# Patient Record
Sex: MALE | Race: WHITE | NOT HISPANIC OR LATINO | Employment: OTHER | URBAN - NONMETROPOLITAN AREA
[De-identification: names, ages, dates, MRNs, and addresses within clinical notes are randomized per-mention and may not be internally consistent; named-entity substitution may affect disease eponyms.]

---

## 2018-05-24 ENCOUNTER — APPOINTMENT (OUTPATIENT)
Dept: GENERAL RADIOLOGY | Facility: HOSPITAL | Age: 68
End: 2018-05-24

## 2018-05-24 ENCOUNTER — HOSPITAL ENCOUNTER (OUTPATIENT)
Facility: HOSPITAL | Age: 68
Setting detail: OBSERVATION
Discharge: HOME HEALTH | End: 2018-05-27
Attending: EMERGENCY MEDICINE | Admitting: INTERNAL MEDICINE

## 2018-05-24 DIAGNOSIS — I48.91 ATRIAL FIBRILLATION, UNSPECIFIED TYPE (HCC): ICD-10-CM

## 2018-05-24 DIAGNOSIS — I95.9 HYPOTENSION, UNSPECIFIED HYPOTENSION TYPE: Primary | ICD-10-CM

## 2018-05-24 PROBLEM — J42 CHRONIC BRONCHITIS (HCC): Chronic | Status: ACTIVE | Noted: 2018-05-24

## 2018-05-24 PROBLEM — N18.30 CKD (CHRONIC KIDNEY DISEASE) STAGE 3, GFR 30-59 ML/MIN (HCC): Chronic | Status: ACTIVE | Noted: 2018-05-24

## 2018-05-24 PROBLEM — I70.90 ATHEROSCLEROSIS: Chronic | Status: ACTIVE | Noted: 2018-05-24

## 2018-05-24 PROBLEM — I48.20 CHRONIC ATRIAL FIBRILLATION (HCC): Chronic | Status: ACTIVE | Noted: 2018-05-24

## 2018-05-24 LAB
ALBUMIN SERPL-MCNC: 4.2 G/DL (ref 3.4–4.8)
ALBUMIN/GLOB SERPL: 1.4 G/DL (ref 1.1–1.8)
ALP SERPL-CCNC: 64 U/L (ref 38–126)
ALT SERPL W P-5'-P-CCNC: 25 U/L (ref 21–72)
ANION GAP SERPL CALCULATED.3IONS-SCNC: 12 MMOL/L (ref 5–15)
AST SERPL-CCNC: 17 U/L (ref 17–59)
BASOPHILS # BLD AUTO: 0.03 10*3/MM3 (ref 0–0.2)
BASOPHILS NFR BLD AUTO: 0.3 % (ref 0–2)
BILIRUB SERPL-MCNC: 0.6 MG/DL (ref 0.2–1.3)
BILIRUB UR QL STRIP: NEGATIVE
BUN BLD-MCNC: 32 MG/DL (ref 7–21)
BUN/CREAT SERPL: 18 (ref 7–25)
CALCIUM SPEC-SCNC: 9.3 MG/DL (ref 8.4–10.2)
CHLORIDE SERPL-SCNC: 97 MMOL/L (ref 95–110)
CLARITY UR: CLEAR
CO2 SERPL-SCNC: 26 MMOL/L (ref 22–31)
COLOR UR: YELLOW
CREAT BLD-MCNC: 1.78 MG/DL (ref 0.7–1.3)
D-LACTATE SERPL-SCNC: 2.1 MMOL/L (ref 0.5–2)
D-LACTATE SERPL-SCNC: 2.4 MMOL/L (ref 0.5–2)
DEPRECATED RDW RBC AUTO: 42.3 FL (ref 35.1–43.9)
EOSINOPHIL # BLD AUTO: 0.16 10*3/MM3 (ref 0–0.7)
EOSINOPHIL NFR BLD AUTO: 1.8 % (ref 0–7)
ERYTHROCYTE [DISTWIDTH] IN BLOOD BY AUTOMATED COUNT: 13 % (ref 11.5–14.5)
GFR SERPL CREATININE-BSD FRML MDRD: 38 ML/MIN/1.73 (ref 49–113)
GLOBULIN UR ELPH-MCNC: 2.9 GM/DL (ref 2.3–3.5)
GLUCOSE BLD-MCNC: 191 MG/DL (ref 60–100)
GLUCOSE UR STRIP-MCNC: ABNORMAL MG/DL
HCT VFR BLD AUTO: 40.9 % (ref 39–49)
HGB BLD-MCNC: 14.7 G/DL (ref 13.7–17.3)
HGB UR QL STRIP.AUTO: NEGATIVE
HOLD SPECIMEN: NORMAL
IMM GRANULOCYTES # BLD: 0.03 10*3/MM3 (ref 0–0.02)
IMM GRANULOCYTES NFR BLD: 0.3 % (ref 0–0.5)
INR PPP: 1.11 (ref 0.8–1.2)
KETONES UR QL STRIP: NEGATIVE
LEUKOCYTE ESTERASE UR QL STRIP.AUTO: NEGATIVE
LYMPHOCYTES # BLD AUTO: 2.68 10*3/MM3 (ref 0.6–4.2)
LYMPHOCYTES NFR BLD AUTO: 29.7 % (ref 10–50)
MAGNESIUM SERPL-MCNC: 1.8 MG/DL (ref 1.6–2.3)
MCH RBC QN AUTO: 32 PG (ref 26.5–34)
MCHC RBC AUTO-ENTMCNC: 35.9 G/DL (ref 31.5–36.3)
MCV RBC AUTO: 89.1 FL (ref 80–98)
MONOCYTES # BLD AUTO: 0.57 10*3/MM3 (ref 0–0.9)
MONOCYTES NFR BLD AUTO: 6.3 % (ref 0–12)
NEUTROPHILS # BLD AUTO: 5.54 10*3/MM3 (ref 2–8.6)
NEUTROPHILS NFR BLD AUTO: 61.6 % (ref 37–80)
NITRITE UR QL STRIP: NEGATIVE
NT-PROBNP SERPL-MCNC: 426 PG/ML (ref 0–900)
PH UR STRIP.AUTO: 5.5 [PH] (ref 5–9)
PLATELET # BLD AUTO: 188 10*3/MM3 (ref 150–450)
PMV BLD AUTO: 10.3 FL (ref 8–12)
POTASSIUM BLD-SCNC: 4.2 MMOL/L (ref 3.5–5.1)
PROT SERPL-MCNC: 7.1 G/DL (ref 6.3–8.6)
PROT UR QL STRIP: NEGATIVE
PROTHROMBIN TIME: 14.1 SECONDS (ref 11.1–15.3)
RBC # BLD AUTO: 4.59 10*6/MM3 (ref 4.37–5.74)
SODIUM BLD-SCNC: 135 MMOL/L (ref 137–145)
SP GR UR STRIP: 1.02 (ref 1–1.03)
TROPONIN I SERPL-MCNC: 0.03 NG/ML
UROBILINOGEN UR QL STRIP: ABNORMAL
WBC NRBC COR # BLD: 9.01 10*3/MM3 (ref 3.2–9.8)
WHOLE BLOOD HOLD SPECIMEN: NORMAL
WHOLE BLOOD HOLD SPECIMEN: NORMAL

## 2018-05-24 PROCEDURE — 85610 PROTHROMBIN TIME: CPT | Performed by: EMERGENCY MEDICINE

## 2018-05-24 PROCEDURE — G0378 HOSPITAL OBSERVATION PER HR: HCPCS

## 2018-05-24 PROCEDURE — 99285 EMERGENCY DEPT VISIT HI MDM: CPT

## 2018-05-24 PROCEDURE — 82962 GLUCOSE BLOOD TEST: CPT

## 2018-05-24 PROCEDURE — 93005 ELECTROCARDIOGRAM TRACING: CPT | Performed by: EMERGENCY MEDICINE

## 2018-05-24 PROCEDURE — 96360 HYDRATION IV INFUSION INIT: CPT

## 2018-05-24 PROCEDURE — 84484 ASSAY OF TROPONIN QUANT: CPT | Performed by: PHYSICIAN ASSISTANT

## 2018-05-24 PROCEDURE — 93010 ELECTROCARDIOGRAM REPORT: CPT | Performed by: INTERNAL MEDICINE

## 2018-05-24 PROCEDURE — 83605 ASSAY OF LACTIC ACID: CPT | Performed by: EMERGENCY MEDICINE

## 2018-05-24 PROCEDURE — 87040 BLOOD CULTURE FOR BACTERIA: CPT | Performed by: EMERGENCY MEDICINE

## 2018-05-24 PROCEDURE — 85025 COMPLETE CBC W/AUTO DIFF WBC: CPT | Performed by: EMERGENCY MEDICINE

## 2018-05-24 PROCEDURE — 71045 X-RAY EXAM CHEST 1 VIEW: CPT

## 2018-05-24 PROCEDURE — 96361 HYDRATE IV INFUSION ADD-ON: CPT

## 2018-05-24 PROCEDURE — 81003 URINALYSIS AUTO W/O SCOPE: CPT | Performed by: EMERGENCY MEDICINE

## 2018-05-24 PROCEDURE — 80053 COMPREHEN METABOLIC PANEL: CPT | Performed by: EMERGENCY MEDICINE

## 2018-05-24 PROCEDURE — 83880 ASSAY OF NATRIURETIC PEPTIDE: CPT | Performed by: PHYSICIAN ASSISTANT

## 2018-05-24 PROCEDURE — 83735 ASSAY OF MAGNESIUM: CPT | Performed by: PHYSICIAN ASSISTANT

## 2018-05-24 RX ORDER — TAMSULOSIN HYDROCHLORIDE 0.4 MG/1
2 CAPSULE ORAL NIGHTLY
COMMUNITY

## 2018-05-24 RX ORDER — DEXTROSE MONOHYDRATE 25 G/50ML
25 INJECTION, SOLUTION INTRAVENOUS
Status: DISCONTINUED | OUTPATIENT
Start: 2018-05-24 | End: 2018-05-27 | Stop reason: HOSPADM

## 2018-05-24 RX ORDER — GABAPENTIN 300 MG/1
300 CAPSULE ORAL EVERY 12 HOURS SCHEDULED
Status: DISCONTINUED | OUTPATIENT
Start: 2018-05-25 | End: 2018-05-27 | Stop reason: HOSPADM

## 2018-05-24 RX ORDER — LEVOTHYROXINE SODIUM 112 UG/1
112 TABLET ORAL
Status: DISCONTINUED | OUTPATIENT
Start: 2018-05-25 | End: 2018-05-27 | Stop reason: HOSPADM

## 2018-05-24 RX ORDER — ATORVASTATIN CALCIUM 80 MG/1
80 TABLET, FILM COATED ORAL DAILY
COMMUNITY

## 2018-05-24 RX ORDER — ONDANSETRON HYDROCHLORIDE 8 MG/1
TABLET, FILM COATED ORAL EVERY 8 HOURS PRN
COMMUNITY

## 2018-05-24 RX ORDER — SODIUM CHLORIDE 9 MG/ML
75 INJECTION, SOLUTION INTRAVENOUS CONTINUOUS
Status: DISPENSED | OUTPATIENT
Start: 2018-05-24 | End: 2018-05-25

## 2018-05-24 RX ORDER — SODIUM CHLORIDE 0.9 % (FLUSH) 0.9 %
1-10 SYRINGE (ML) INJECTION AS NEEDED
Status: DISCONTINUED | OUTPATIENT
Start: 2018-05-24 | End: 2018-05-25 | Stop reason: CLARIF

## 2018-05-24 RX ORDER — ATORVASTATIN CALCIUM 40 MG/1
80 TABLET, FILM COATED ORAL DAILY
Status: DISCONTINUED | OUTPATIENT
Start: 2018-05-25 | End: 2018-05-27 | Stop reason: HOSPADM

## 2018-05-24 RX ORDER — LEVOTHYROXINE SODIUM 112 UG/1
112 TABLET ORAL DAILY
COMMUNITY

## 2018-05-24 RX ORDER — NICOTINE POLACRILEX 4 MG
15 LOZENGE BUCCAL
Status: DISCONTINUED | OUTPATIENT
Start: 2018-05-24 | End: 2018-05-27 | Stop reason: HOSPADM

## 2018-05-24 RX ORDER — METOPROLOL TARTRATE 100 MG/1
100 TABLET ORAL 2 TIMES DAILY
COMMUNITY
End: 2018-05-27 | Stop reason: HOSPADM

## 2018-05-24 RX ORDER — LOSARTAN POTASSIUM 25 MG/1
25 TABLET ORAL DAILY
COMMUNITY
End: 2018-05-27 | Stop reason: HOSPADM

## 2018-05-24 RX ORDER — FUROSEMIDE 40 MG/1
40 TABLET ORAL 2 TIMES DAILY
COMMUNITY
End: 2018-05-27 | Stop reason: HOSPADM

## 2018-05-24 RX ORDER — SODIUM CHLORIDE 0.9 % (FLUSH) 0.9 %
10 SYRINGE (ML) INJECTION AS NEEDED
Status: DISCONTINUED | OUTPATIENT
Start: 2018-05-24 | End: 2018-05-27 | Stop reason: HOSPADM

## 2018-05-24 RX ORDER — TAMSULOSIN HYDROCHLORIDE 0.4 MG/1
0.4 CAPSULE ORAL NIGHTLY
Status: DISCONTINUED | OUTPATIENT
Start: 2018-05-25 | End: 2018-05-27 | Stop reason: HOSPADM

## 2018-05-24 RX ORDER — SPIRONOLACTONE 25 MG/1
25 TABLET ORAL DAILY
COMMUNITY

## 2018-05-24 RX ORDER — OMEPRAZOLE 20 MG/1
20 CAPSULE, DELAYED RELEASE ORAL DAILY
COMMUNITY

## 2018-05-24 RX ORDER — SODIUM CHLORIDE 9 MG/ML
INJECTION, SOLUTION INTRAVENOUS
Status: DISCONTINUED
Start: 2018-05-24 | End: 2018-05-27 | Stop reason: HOSPADM

## 2018-05-24 RX ORDER — GABAPENTIN 300 MG/1
400 CAPSULE ORAL 3 TIMES DAILY
COMMUNITY

## 2018-05-24 RX ADMIN — SODIUM CHLORIDE 1000 ML: 9 INJECTION, SOLUTION INTRAVENOUS at 19:22

## 2018-05-24 RX ADMIN — SODIUM CHLORIDE 1000 ML: 900 INJECTION, SOLUTION INTRAVENOUS at 18:38

## 2018-05-24 NOTE — ED PROVIDER NOTES
"Subjective   Patient presents to emergency department for Dizziness x 4 days.  States over the past few days he has had \"dry heaves\" and has not been able to keep food/fluids/medication down.  Denies any active vomiting.  Endorses associated muscle cramping in multiple different locations of his body.  Hx of chronic atrial fibrillation and on eliquis.          History provided by:  Patient   used: No    Dizziness   Quality:  Lightheadedness  Severity:  Moderate  Onset quality:  Gradual  Duration:  4 days  Timing:  Intermittent  Progression:  Worsening  Chronicity:  New  Context: standing up    Relieved by:  Lying down and being still  Associated symptoms: nausea    Associated symptoms: no headaches, no palpitations, no shortness of breath, no syncope, no vision changes, no vomiting and no weakness    Risk factors: multiple medications        Review of Systems   Constitutional: Negative for chills, fever and unexpected weight change.   HENT: Negative for trouble swallowing.    Eyes: Negative for visual disturbance.   Respiratory: Negative for shortness of breath.    Cardiovascular: Negative for palpitations and syncope.   Gastrointestinal: Positive for nausea. Negative for vomiting.   Genitourinary: Negative for dysuria and flank pain.   Musculoskeletal: Negative for back pain and neck pain.   Skin: Negative for color change.   Allergic/Immunologic: Negative for immunocompromised state.   Neurological: Positive for dizziness and light-headedness. Negative for seizures, syncope, facial asymmetry, speech difficulty, weakness, numbness and headaches.   Hematological: Does not bruise/bleed easily.       Past Medical History:   Diagnosis Date   • Atrial fibrillation    • CHF (congestive heart failure)    • Diabetes mellitus    • GERD (gastroesophageal reflux disease)    • Graves disease    • Hypertension        No Known Allergies    Past Surgical History:   Procedure Laterality Date   • HERNIA REPAIR   " "  • TONSILLECTOMY         History reviewed. No pertinent family history.    Social History     Social History   • Marital status:      Social History Main Topics   • Smoking status: Current Every Day Smoker     Packs/day: 1.00     Types: Cigarettes   • Smokeless tobacco: Never Used   • Alcohol use No   • Drug use: No     Other Topics Concern   • Not on file           Objective      BP (!) 85/52   Pulse 88   Temp 97 °F (36.1 °C)   Resp 16   Ht 177.8 cm (70\")   Wt 86.2 kg (190 lb)   SpO2 100%   BMI 27.26 kg/m²     Physical Exam   Constitutional: He is oriented to person, place, and time. He appears well-developed and well-nourished. No distress.   Eyes: Conjunctivae and EOM are normal.   Neck: Neck supple.   Cardiovascular: Normal rate, regular rhythm and intact distal pulses.    Pulmonary/Chest: Effort normal and breath sounds normal. No respiratory distress. He has no wheezes.   Abdominal: Soft. Bowel sounds are normal. He exhibits no distension. There is no tenderness.   Neurological: He is alert and oriented to person, place, and time. He has normal strength. No cranial nerve deficit or sensory deficit. He displays a negative Romberg sign. GCS eye subscore is 4. GCS verbal subscore is 5. GCS motor subscore is 6.   Skin: Skin is warm. Capillary refill takes less than 2 seconds.   Psychiatric: He has a normal mood and affect. His behavior is normal. Thought content normal.   Nursing note and vitals reviewed.      Procedures           ED Course      Results for orders placed or performed during the hospital encounter of 05/24/18   Comprehensive Metabolic Panel   Result Value Ref Range    Glucose 191 (H) 60 - 100 mg/dL    BUN 32 (H) 7 - 21 mg/dL    Creatinine 1.78 (H) 0.70 - 1.30 mg/dL    Sodium 135 (L) 137 - 145 mmol/L    Potassium 4.2 3.5 - 5.1 mmol/L    Chloride 97 95 - 110 mmol/L    CO2 26.0 22.0 - 31.0 mmol/L    Calcium 9.3 8.4 - 10.2 mg/dL    Total Protein 7.1 6.3 - 8.6 g/dL    Albumin 4.20 3.40 - " 4.80 g/dL    ALT (SGPT) 25 21 - 72 U/L    AST (SGOT) 17 17 - 59 U/L    Alkaline Phosphatase 64 38 - 126 U/L    Total Bilirubin 0.6 0.2 - 1.3 mg/dL    eGFR Non  Amer 38 (L) 49 - 113 mL/min/1.73    Globulin 2.9 2.3 - 3.5 gm/dL    A/G Ratio 1.4 1.1 - 1.8 g/dL    BUN/Creatinine Ratio 18.0 7.0 - 25.0    Anion Gap 12.0 5.0 - 15.0 mmol/L   Protime-INR   Result Value Ref Range    Protime 14.1 11.1 - 15.3 Seconds    INR 1.11 0.80 - 1.20   Lactic Acid, Plasma   Result Value Ref Range    Lactate 2.4 (C) 0.5 - 2.0 mmol/L   CBC Auto Differential   Result Value Ref Range    WBC 9.01 3.20 - 9.80 10*3/mm3    RBC 4.59 4.37 - 5.74 10*6/mm3    Hemoglobin 14.7 13.7 - 17.3 g/dL    Hematocrit 40.9 39.0 - 49.0 %    MCV 89.1 80.0 - 98.0 fL    MCH 32.0 26.5 - 34.0 pg    MCHC 35.9 31.5 - 36.3 g/dL    RDW 13.0 11.5 - 14.5 %    RDW-SD 42.3 35.1 - 43.9 fl    MPV 10.3 8.0 - 12.0 fL    Platelets 188 150 - 450 10*3/mm3    Neutrophil % 61.6 37.0 - 80.0 %    Lymphocyte % 29.7 10.0 - 50.0 %    Monocyte % 6.3 0.0 - 12.0 %    Eosinophil % 1.8 0.0 - 7.0 %    Basophil % 0.3 0.0 - 2.0 %    Immature Grans % 0.3 0.0 - 0.5 %    Neutrophils, Absolute 5.54 2.00 - 8.60 10*3/mm3    Lymphocytes, Absolute 2.68 0.60 - 4.20 10*3/mm3    Monocytes, Absolute 0.57 0.00 - 0.90 10*3/mm3    Eosinophils, Absolute 0.16 0.00 - 0.70 10*3/mm3    Basophils, Absolute 0.03 0.00 - 0.20 10*3/mm3    Immature Grans, Absolute 0.03 (H) 0.00 - 0.02 10*3/mm3   Troponin   Result Value Ref Range    Troponin I 0.033 <=0.034 ng/mL   BNP   Result Value Ref Range    proBNP 426.0 0.0 - 900.0 pg/mL   Magnesium   Result Value Ref Range    Magnesium 1.8 1.6 - 2.3 mg/dL   Light Blue Top   Result Value Ref Range    Extra Tube hold for add-on    Green Top (Gel)   Result Value Ref Range    Extra Tube Hold for add-ons.    Lavender Top   Result Value Ref Range    Extra Tube hold for add-on    Gold Top - SST   Result Value Ref Range    Extra Tube Hold for add-ons.      Xr Chest 1 View    Result  Date: 5/24/2018  EXAM:         Radiograph(s), Chest VIEWS:   Portable ; 1     DATE/TIME:  5/24/2018 8:41 PM CDT            INDICATION:   dizziness  COMPARISON:  CXR: none         FINDINGS:         - lines/tubes:    none   - cardiac:         size within normal limits       - mediastinum: contour within normal limits       - lungs:         no focal air space process, pulmonary interstitial edema, nodule(s)/mass           - pleura:         no evidence of  fluid                - osseous:         unremarkable for age                - misc.:        CONCLUSION:    1. No evidence of an active cardiopulmonary process.                                  Electronically signed by:  FANY Fuller MD  5/24/2018 8:41 PM CDT Workstation: [id number]                [facility]      Final diagnoses:   Hypotension, unspecified hypotension type   Atrial fibrillation, unspecified type            Roshan Carrillo PA-C  05/24/18 2058

## 2018-05-25 ENCOUNTER — APPOINTMENT (OUTPATIENT)
Dept: CARDIOLOGY | Facility: HOSPITAL | Age: 68
End: 2018-05-25
Attending: INTERNAL MEDICINE

## 2018-05-25 ENCOUNTER — APPOINTMENT (OUTPATIENT)
Dept: ULTRASOUND IMAGING | Facility: HOSPITAL | Age: 68
End: 2018-05-25

## 2018-05-25 LAB
ANION GAP SERPL CALCULATED.3IONS-SCNC: 8 MMOL/L (ref 5–15)
BH CV ECHO MEAS - ACS: 1.9 CM
BH CV ECHO MEAS - AO ISTHMUS: 3 CM
BH CV ECHO MEAS - AO MAX PG (FULL): 1.3 MMHG
BH CV ECHO MEAS - AO MAX PG: 2.4 MMHG
BH CV ECHO MEAS - AO MEAN PG (FULL): 0 MMHG
BH CV ECHO MEAS - AO MEAN PG: 1 MMHG
BH CV ECHO MEAS - AO ROOT AREA (BSA CORRECTED): 1.9
BH CV ECHO MEAS - AO ROOT AREA: 11.3 CM^2
BH CV ECHO MEAS - AO ROOT DIAM: 3.8 CM
BH CV ECHO MEAS - AO V2 MAX: 78.2 CM/SEC
BH CV ECHO MEAS - AO V2 MEAN: 51 CM/SEC
BH CV ECHO MEAS - AO V2 VTI: 9.7 CM
BH CV ECHO MEAS - ASC AORTA: 3.6 CM
BH CV ECHO MEAS - AVA(I,A): 3.2 CM^2
BH CV ECHO MEAS - AVA(I,D): 3.2 CM^2
BH CV ECHO MEAS - AVA(V,A): 2.3 CM^2
BH CV ECHO MEAS - AVA(V,D): 2.3 CM^2
BH CV ECHO MEAS - BSA(HAYCOCK): 2.1 M^2
BH CV ECHO MEAS - BSA: 2 M^2
BH CV ECHO MEAS - BZI_BMI: 27.3 KILOGRAMS/M^2
BH CV ECHO MEAS - BZI_METRIC_HEIGHT: 177.8 CM
BH CV ECHO MEAS - BZI_METRIC_WEIGHT: 86.2 KG
BH CV ECHO MEAS - CONTRAST EF (2CH): 43 ML/M^2
BH CV ECHO MEAS - CONTRAST EF 4CH: 35 ML/M^2
BH CV ECHO MEAS - EDV(CUBED): 117.6 ML
BH CV ECHO MEAS - EDV(MOD-SP2): 127 ML
BH CV ECHO MEAS - EDV(MOD-SP4): 117 ML
BH CV ECHO MEAS - EDV(TEICH): 112.8 ML
BH CV ECHO MEAS - EF(CUBED): 46.4 %
BH CV ECHO MEAS - EF(MOD-SP2): 43 %
BH CV ECHO MEAS - EF(MOD-SP4): 35 %
BH CV ECHO MEAS - EF(TEICH): 38.7 %
BH CV ECHO MEAS - EPSS: 1.7 CM
BH CV ECHO MEAS - ESV(CUBED): 63 ML
BH CV ECHO MEAS - ESV(MOD-SP2): 72.4 ML
BH CV ECHO MEAS - ESV(MOD-SP4): 76.1 ML
BH CV ECHO MEAS - ESV(TEICH): 69.2 ML
BH CV ECHO MEAS - FS: 18.8 %
BH CV ECHO MEAS - IVS/LVPW: 1.3
BH CV ECHO MEAS - IVSD: 1.4 CM
BH CV ECHO MEAS - LA DIMENSION: 4.7 CM
BH CV ECHO MEAS - LA/AO: 1.2
BH CV ECHO MEAS - LV DIASTOLIC VOL/BSA (35-75): 57.3 ML/M^2
BH CV ECHO MEAS - LV MASS(C)D: 227.7 GRAMS
BH CV ECHO MEAS - LV MASS(C)DI: 111.5 GRAMS/M^2
BH CV ECHO MEAS - LV MAX PG: 1.1 MMHG
BH CV ECHO MEAS - LV MEAN PG: 1 MMHG
BH CV ECHO MEAS - LV SYSTOLIC VOL/BSA (12-30): 37.3 ML/M^2
BH CV ECHO MEAS - LV V1 MAX: 52.9 CM/SEC
BH CV ECHO MEAS - LV V1 MEAN: 35.5 CM/SEC
BH CV ECHO MEAS - LV V1 VTI: 9.1 CM
BH CV ECHO MEAS - LVIDD: 4.9 CM
BH CV ECHO MEAS - LVIDS: 4 CM
BH CV ECHO MEAS - LVLD AP2: 7.5 CM
BH CV ECHO MEAS - LVLD AP4: 6.5 CM
BH CV ECHO MEAS - LVLS AP2: 6 CM
BH CV ECHO MEAS - LVLS AP4: 6.4 CM
BH CV ECHO MEAS - LVOT AREA (M): 3.5 CM^2
BH CV ECHO MEAS - LVOT AREA: 3.5 CM^2
BH CV ECHO MEAS - LVOT DIAM: 2.1 CM
BH CV ECHO MEAS - LVPWD: 1.1 CM
BH CV ECHO MEAS - MR MAX PG: 102 MMHG
BH CV ECHO MEAS - MR MAX VEL: 505 CM/SEC
BH CV ECHO MEAS - MV DEC SLOPE: 699 CM/SEC^2
BH CV ECHO MEAS - MV MAX PG: 5.7 MMHG
BH CV ECHO MEAS - MV MEAN PG: 2 MMHG
BH CV ECHO MEAS - MV P1/2T MAX VEL: 119 CM/SEC
BH CV ECHO MEAS - MV P1/2T: 49.9 MSEC
BH CV ECHO MEAS - MV V2 MAX: 119 CM/SEC
BH CV ECHO MEAS - MV V2 MEAN: 61.7 CM/SEC
BH CV ECHO MEAS - MV V2 VTI: 20.5 CM
BH CV ECHO MEAS - MVA P1/2T LCG: 1.8 CM^2
BH CV ECHO MEAS - MVA(P1/2T): 4.4 CM^2
BH CV ECHO MEAS - MVA(VTI): 1.5 CM^2
BH CV ECHO MEAS - PA MAX PG: 1.4 MMHG
BH CV ECHO MEAS - PA V2 MAX: 59.7 CM/SEC
BH CV ECHO MEAS - PI END-D VEL: 65.8 CM/SEC
BH CV ECHO MEAS - RVDD: 3.4 CM
BH CV ECHO MEAS - SI(AO): 54 ML/M^2
BH CV ECHO MEAS - SI(CUBED): 26.7 ML/M^2
BH CV ECHO MEAS - SI(LVOT): 15.4 ML/M^2
BH CV ECHO MEAS - SI(MOD-SP2): 26.7 ML/M^2
BH CV ECHO MEAS - SI(MOD-SP4): 20 ML/M^2
BH CV ECHO MEAS - SI(TEICH): 21.4 ML/M^2
BH CV ECHO MEAS - SV(AO): 110.3 ML
BH CV ECHO MEAS - SV(CUBED): 54.6 ML
BH CV ECHO MEAS - SV(LVOT): 31.6 ML
BH CV ECHO MEAS - SV(MOD-SP2): 54.6 ML
BH CV ECHO MEAS - SV(MOD-SP4): 40.9 ML
BH CV ECHO MEAS - SV(TEICH): 43.6 ML
BH CV ECHO MEAS - TR MAX VEL: 236 CM/SEC
BUN BLD-MCNC: 25 MG/DL (ref 7–21)
BUN/CREAT SERPL: 22.3 (ref 7–25)
CALCIUM SPEC-SCNC: 8.5 MG/DL (ref 8.4–10.2)
CHLORIDE SERPL-SCNC: 108 MMOL/L (ref 95–110)
CO2 SERPL-SCNC: 24 MMOL/L (ref 22–31)
CREAT BLD-MCNC: 1.12 MG/DL (ref 0.7–1.3)
GFR SERPL CREATININE-BSD FRML MDRD: 66 ML/MIN/1.73 (ref 49–113)
GLUCOSE BLD-MCNC: 78 MG/DL (ref 60–100)
GLUCOSE BLDC GLUCOMTR-MCNC: 119 MG/DL (ref 70–130)
GLUCOSE BLDC GLUCOMTR-MCNC: 137 MG/DL (ref 70–130)
GLUCOSE BLDC GLUCOMTR-MCNC: 143 MG/DL (ref 70–130)
GLUCOSE BLDC GLUCOMTR-MCNC: 220 MG/DL (ref 70–130)
LV EF 2D ECHO EST: 43 %
MAXIMAL PREDICTED HEART RATE: 154 BPM
POTASSIUM BLD-SCNC: 3.9 MMOL/L (ref 3.5–5.1)
SODIUM BLD-SCNC: 140 MMOL/L (ref 137–145)
STRESS TARGET HR: 131 BPM

## 2018-05-25 PROCEDURE — G0378 HOSPITAL OBSERVATION PER HR: HCPCS

## 2018-05-25 PROCEDURE — 96361 HYDRATE IV INFUSION ADD-ON: CPT

## 2018-05-25 PROCEDURE — 93880 EXTRACRANIAL BILAT STUDY: CPT

## 2018-05-25 PROCEDURE — 82962 GLUCOSE BLOOD TEST: CPT

## 2018-05-25 PROCEDURE — 93306 TTE W/DOPPLER COMPLETE: CPT | Performed by: INTERNAL MEDICINE

## 2018-05-25 PROCEDURE — 80048 BASIC METABOLIC PNL TOTAL CA: CPT | Performed by: INTERNAL MEDICINE

## 2018-05-25 PROCEDURE — 93306 TTE W/DOPPLER COMPLETE: CPT

## 2018-05-25 PROCEDURE — 63710000001 INSULIN ASPART PER 5 UNITS: Performed by: INTERNAL MEDICINE

## 2018-05-25 PROCEDURE — 25010000002 PERFLUTREN (DEFINITY) 8.476 MG IN SODIUM CHLORIDE 0.9 % 10 ML INJECTION: Performed by: INTERNAL MEDICINE

## 2018-05-25 RX ORDER — SODIUM CHLORIDE 9 MG/ML
75 INJECTION, SOLUTION INTRAVENOUS CONTINUOUS
Status: ACTIVE | OUTPATIENT
Start: 2018-05-25 | End: 2018-05-25

## 2018-05-25 RX ORDER — SODIUM CHLORIDE 9 MG/ML
75 INJECTION, SOLUTION INTRAVENOUS CONTINUOUS
Status: DISCONTINUED | OUTPATIENT
Start: 2018-05-25 | End: 2018-05-25

## 2018-05-25 RX ORDER — SODIUM CHLORIDE 0.9 % (FLUSH) 0.9 %
1-10 SYRINGE (ML) INJECTION AS NEEDED
Status: DISCONTINUED | OUTPATIENT
Start: 2018-05-25 | End: 2018-05-27 | Stop reason: HOSPADM

## 2018-05-25 RX ADMIN — GABAPENTIN 300 MG: 300 CAPSULE ORAL at 08:44

## 2018-05-25 RX ADMIN — LEVOTHYROXINE SODIUM 112 MCG: 112 TABLET ORAL at 06:44

## 2018-05-25 RX ADMIN — INSULIN ASPART 10 UNITS: 100 INJECTION, SOLUTION INTRAVENOUS; SUBCUTANEOUS at 17:06

## 2018-05-25 RX ADMIN — GABAPENTIN 300 MG: 300 CAPSULE ORAL at 21:00

## 2018-05-25 RX ADMIN — SODIUM CHLORIDE 75 ML/HR: 900 INJECTION, SOLUTION INTRAVENOUS at 11:30

## 2018-05-25 RX ADMIN — APIXABAN 5 MG: 5 TABLET, FILM COATED ORAL at 11:11

## 2018-05-25 RX ADMIN — SODIUM CHLORIDE 75 ML/HR: 900 INJECTION, SOLUTION INTRAVENOUS at 01:36

## 2018-05-25 RX ADMIN — APIXABAN 5 MG: 5 TABLET, FILM COATED ORAL at 01:36

## 2018-05-25 RX ADMIN — INSULIN ASPART 10 UNITS: 100 INJECTION, SOLUTION INTRAVENOUS; SUBCUTANEOUS at 11:22

## 2018-05-25 RX ADMIN — ATORVASTATIN CALCIUM 80 MG: 40 TABLET, FILM COATED ORAL at 11:10

## 2018-05-25 RX ADMIN — GABAPENTIN 300 MG: 300 CAPSULE ORAL at 01:36

## 2018-05-25 RX ADMIN — APIXABAN 5 MG: 5 TABLET, FILM COATED ORAL at 21:00

## 2018-05-25 RX ADMIN — INSULIN ASPART 5 UNITS: 100 INJECTION, SOLUTION INTRAVENOUS; SUBCUTANEOUS at 01:36

## 2018-05-25 RX ADMIN — SODIUM CHLORIDE 75 ML/HR: 900 INJECTION, SOLUTION INTRAVENOUS at 11:31

## 2018-05-25 RX ADMIN — SODIUM CHLORIDE 1.3 ML: 9 INJECTION INTRAMUSCULAR; INTRAVENOUS; SUBCUTANEOUS at 11:30

## 2018-05-25 RX ADMIN — TAMSULOSIN HYDROCHLORIDE 0.4 MG: 0.4 CAPSULE ORAL at 01:36

## 2018-05-25 RX ADMIN — TAMSULOSIN HYDROCHLORIDE 0.4 MG: 0.4 CAPSULE ORAL at 21:00

## 2018-05-25 RX ADMIN — INSULIN ASPART 10 UNITS: 100 INJECTION, SOLUTION INTRAVENOUS; SUBCUTANEOUS at 08:43

## 2018-05-25 NOTE — PROGRESS NOTES
[facility] Medicine Services  INPATIENT PROGRESS NOTE    Length of Stay: 0  Date of Admission: 5/24/2018  Primary Care Physician: Angélica So MD    Subjective     Chief Complaint   Patient presents with   • Dizziness     HPI:  67 year old male who was presented to ED with complain of having dizziness for 4 days. Pt has history of A.fib, and it has been rate control.     5/25/18: pt is doing well today, dizziness is better, BP has improved.     Review of Systems   Constitutional: Positive for activity change, appetite change and fatigue. Negative for chills and fever.   HENT: Negative for congestion and rhinorrhea.    Respiratory: Negative for choking, shortness of breath and wheezing.    Cardiovascular: Negative for chest pain, palpitations and leg swelling.   Gastrointestinal: Negative for abdominal pain, blood in stool, constipation, diarrhea, nausea and vomiting.   Genitourinary: Negative for dysuria and frequency.   Musculoskeletal: Negative for back pain.   Neurological: Positive for dizziness and light-headedness.   Hematological: Negative for adenopathy. Does not bruise/bleed easily.   Psychiatric/Behavioral: Negative for agitation, behavioral problems and confusion.        All pertinent negatives and positives are as above. All other systems have been reviewed and are negative unless otherwise stated.     Objective      Vital Sign Min/Max for last 24 hours  Temp  Min: 96.1 °F (35.6 °C)  Max: 97 °F (36.1 °C)   BP  Min: 78/51  Max: 109/72   Pulse  Min: 78  Max: 105   Resp  Min: 15  Max: 18   SpO2  Min: 93 %  Max: 100 %   No Data Recorded   Weight  Min: 86.2 kg (190 lb)  Max: 86.2 kg (190 lb)         Physical Exam   Constitutional: He is oriented to person, place, and time. He appears well-developed and well-nourished.   HENT:   Head: Normocephalic and atraumatic.   Eyes: Pupils are equal, round, and reactive to light.   Neck: Normal range of motion.    Cardiovascular: Normal rate, regular rhythm and normal heart sounds.    Pulmonary/Chest: Effort normal and breath sounds normal. No respiratory distress. He has no wheezes.   Abdominal: Soft. Bowel sounds are normal.   Musculoskeletal: Normal range of motion.   Neurological: He is alert and oriented to person, place, and time.   Skin: Skin is warm.   Psychiatric: He has a normal mood and affect. His behavior is normal.   Vitals reviewed.      Current Facility-Administered Medications   Medication Dose Route Frequency Provider Last Rate Last Dose   • apixaban (ELIQUIS) tablet 5 mg  5 mg Oral Q12H Edmund Quiros MD   5 mg at 05/25/18 1111   • atorvastatin (LIPITOR) tablet 80 mg  80 mg Oral Daily Edmund Quiros MD   80 mg at 05/25/18 1110   • dextrose (D50W) solution 25 g  25 g Intravenous Q15 Min PRN Edmund Quiros MD       • dextrose (GLUTOSE) oral gel 15 g  15 g Oral Q15 Min PRN Edmund Quiros MD       • gabapentin (NEURONTIN) capsule 300 mg  300 mg Oral Q12H Edmund Quiros MD   300 mg at 05/25/18 0844   • glucagon (human recombinant) (GLUCAGEN DIAGNOSTIC) injection 1 mg  1 mg Subcutaneous PRN Edmund Quiros MD       • insulin aspart (novoLOG) injection 0-14 Units  0-14 Units Subcutaneous 4x Daily AC & at Bedtime Edmund Quiros MD   5 Units at 05/25/18 0136   • insulin aspart (novoLOG) injection 10 Units  10 Units Subcutaneous TID With Meals Edmund Quiros MD   10 Units at 05/25/18 1122   • levothyroxine (SYNTHROID, LEVOTHROID) tablet 112 mcg  112 mcg Oral Q AM Edmund Quiros MD   112 mcg at 05/25/18 0644   • sodium chloride 0.9 % flush 1-10 mL  1-10 mL Intravenous PRN Edmund Quiros MD       • sodium chloride 0.9 % flush 10 mL  10 mL Intravenous PRN Irvin Real MD       • sodium chloride 0.9 % infusion  75 mL/hr Intravenous Continuous Edmund Quiros MD 75 mL/hr at 05/25/18 1131 75 mL/hr at 05/25/18 1131   • sodium chloride 0.9 % infusion  75 mL/hr Intravenous Continuous Rambo Ahmed, MD       • tamsulosin  (FLOMAX) 24 hr capsule 0.4 mg  0.4 mg Oral Nightly Edmund Quiros MD   0.4 mg at 05/25/18 0136           Results Review:  I have reviewed the labs, radiology results, and diagnostic studies.    Laboratory Data:     Results from last 7 days  Lab Units 05/25/18  0536 05/24/18  1823   SODIUM mmol/L 140 135*   POTASSIUM mmol/L 3.9 4.2   CHLORIDE mmol/L 108 97   CO2 mmol/L 24.0 26.0   BUN mg/dL 25* 32*   CREATININE mg/dL 1.12 1.78*   GLUCOSE mg/dL 78 191*   CALCIUM mg/dL 8.5 9.3   BILIRUBIN mg/dL  --  0.6   ALK PHOS U/L  --  64   ALT (SGPT) U/L  --  25   AST (SGOT) U/L  --  17   ANION GAP mmol/L 8.0 12.0     Estimated Creatinine Clearance: 79.1 mL/min (by C-G formula based on SCr of 1.12 mg/dL).    Results from last 7 days  Lab Units 05/24/18  1823   MAGNESIUM mg/dL 1.8           Results from last 7 days  Lab Units 05/24/18  1823   WBC 10*3/mm3 9.01   HEMOGLOBIN g/dL 14.7   HEMATOCRIT % 40.9   PLATELETS 10*3/mm3 188       Results from last 7 days  Lab Units 05/24/18  1823   INR  1.11           Culture Data:   Blood Culture   Date Value Ref Range Status   05/24/2018 No growth at less than 24 hours  Preliminary   05/24/2018 No growth at less than 24 hours  Preliminary     No results found for: URINECX  No results found for: RESPCX  No results found for: WOUNDCX  No results found for: STOOLCX  No components found for: BODYFLD      Radiology Data:   Imaging Results (last 24 hours)     Procedure Component Value Units Date/Time    XR Chest 1 View [[id number]] Collected:  05/24/18 2023     Updated:  05/24/18 2042    Narrative:         EXAM:         Radiograph(s), Chest   VIEWS:   Portable ; 1       DATE/TIME:  5/24/2018 8:41 PM CDT                INDICATION:   dizziness    COMPARISON:  CXR: none             FINDINGS:             - lines/tubes:    none     - cardiac:         size within normal limits         - mediastinum: contour within normal limits         - lungs:         no focal air space process, pulmonary  interstitial  edema, nodule(s)/mass             - pleura:         no evidence of  fluid                  - osseous:         unremarkable for age                  - misc.:         Impression:       CONCLUSION:        1. No evidence of an active cardiopulmonary process.                                                Electronically signed by:  FANY Fuller MD  5/24/2018 8:41  PM CDT Workstation: [id number]          I have reviewed the patient current medications.     Assessment/Plan     Active Hospital Problems (** Indicates Principal Problem)    Diagnosis Date Noted   • **Hypotension [I95.9] 05/24/2018   • Atherosclerosis [I70.90] 05/24/2018   • Chronic bronchitis [J42] 05/24/2018   • CKD (chronic kidney disease) stage 3, GFR 30-59 ml/min [N18.3] 05/24/2018   • Chronic atrial fibrillation [I48.2] 05/24/2018   • Diabetes mellitus [E11.9]       Resolved Hospital Problems    Diagnosis Date Noted Date Resolved   No resolved problems to display.     1. Near syncope: multifactorial, he is rate control A. Fib. Pt is doing well after IV hydration. BP has improved. US carotid is pending. Echo is pending.   2. Hypotension: likely to many antihypertensive medications. IVF BP is doing better.   3. A. Fib: rate control, anticoagulated with eliquis.   4. HTN: currently hypotensive.   5. DM: continue with home Insulin dose. Monitor Blood sugar.   6. Hypothyroidism: levothyroxine   7. JOHN: resolved.       Discharge Planning: I expect patient to be discharged to home in 1-2 days.      DVT Prophylaxis: eliquis.               This document has been electronically signed by Rambo Pritchard MD on May 25, 2018 11:49 AM

## 2018-05-25 NOTE — ED NOTES
Attempted to call report again to [facility] and they said that they would have to call us back shortly.      Anitha Anderson RN  05/24/18 0138

## 2018-05-25 NOTE — PLAN OF CARE
Problem: Fall Risk (Adult)  Goal: Absence of Fall  Outcome: Ongoing (interventions implemented as appropriate)      Problem: Syncope (Adult)  Goal: Physical Safety/Health Maintenance  Outcome: Ongoing (interventions implemented as appropriate)    Goal: Optimal Emotional/Functional [place]  Outcome: Ongoing (interventions implemented as appropriate)      Problem: Patient Care Overview  Goal: Plan of Care Review  Outcome: Ongoing (interventions implemented as appropriate)   05/25/18 1400   Coping/Psychosocial   Plan of Care Reviewed With patient   Plan of Care Review   Progress no change   OTHER   Outcome Summary pt went for echo and us of carotid today vss a/o fall precautions in place

## 2018-05-25 NOTE — PLAN OF CARE
Problem: Fall Risk (Adult)  Goal: Identify Related Risk Factors and Signs and Symptoms  Outcome: Outcome(s) achieved Date Met: 05/25/18    Goal: Absence of Fall  Outcome: Ongoing (interventions implemented as appropriate)      Problem: Syncope (Adult)  Goal: Identify Related Risk Factors and Signs and Symptoms  Outcome: Outcome(s) achieved Date Met: 05/25/18    Goal: Physical Safety/Health Maintenance  Outcome: Ongoing (interventions implemented as appropriate)    Goal: Optimal Emotional/Functional [place]  Outcome: Ongoing (interventions implemented as appropriate)      Problem: Patient Care Overview  Goal: Plan of Care Review  Outcome: Ongoing (interventions implemented as appropriate)   05/25/18 0356   Coping/Psychosocial   Plan of Care Reviewed With patient   Coping/Psychosocial   Patient Agreement with Plan of Care agrees   Plan of Care Review   Progress no change   OTHER   Outcome Summary new admission, BP improving, fall precautions initiated     Goal: Individualization and Mutuality  Outcome: Ongoing (interventions implemented as appropriate)    Goal: Discharge Needs Assessment  Outcome: Ongoing (interventions implemented as appropriate)

## 2018-05-25 NOTE — H&P
History and Physical  Edmund Quiros MD  Hospitalist    Date of admission: 5/24/2018    Patient Care Team:  GARFIELD Christian as PCP     Chief complaint   Chief Complaint   Patient presents with   • Dizziness     Subjective     Patient is a 67 y.o. male admitted for weakness, dizziness. He has felt worse for the last few days. He denies loss of consciousness.     History  Past Medical History:   Diagnosis Date   • Atherosclerosis    • Chronic atrial fibrillation    • Chronic bronchitis    • Diabetes mellitus    • GERD (gastroesophageal reflux disease)    • Graves disease    • Hypertension      Past Surgical History:   Procedure Laterality Date   • HERNIA REPAIR       Family History   Problem Relation Age of Onset   • Hypertension Father      Social History   Substance Use Topics   • Smoking status: Current Every Day Smoker     Packs/day: 1.00     Types: Cigarettes   • Smokeless tobacco: Never Used   • Alcohol use No     Prescriptions Prior to Admission   Medication Sig Dispense Refill Last Dose   • apixaban (ELIQUIS) 5 MG tablet tablet Take 5 mg by mouth 2 (Two) Times a Day. Pt states that the eliquis is ordered twice a day but he has only been taking it once a day for the past 3 years because he is not home enough to take it twice a day.   5/24/2018 at 0900   • atorvastatin (LIPITOR) 80 MG tablet Take 80 mg by mouth Daily.   5/24/2018 at 0900   • cholecalciferol (VITAMIN D3) 27688 units capsule Take  by mouth Daily.   5/24/2018 at 0900   • furosemide (LASIX) 40 MG tablet Take 40 mg by mouth 2 (Two) Times a Day. Pt states that the lasix is ordered twice a day but he only takes it once a day because he is not home enough.   5/24/2018 at 0900   • gabapentin (NEURONTIN) 300 MG capsule Take 300 mg by mouth 3 (Three) Times a Day.   5/24/2018 at 0900   • insulin aspart (novoLOG) 100 UNIT/ML injection Inject  under the skin 3 (Three) Times a Day Before Meals.   5/24/2018 at 0900   • levothyroxine (SYNTHROID, LEVOTHROID)  112 MCG tablet Take 112 mcg by mouth Daily.   5/24/2018 at 0900   • losartan (COZAAR) 25 MG tablet Take 25 mg by mouth Daily.   5/24/2018 at 0900   • metoprolol tartrate (LOPRESSOR) 100 MG tablet Take 100 mg by mouth 2 (Two) Times a Day. Pt states that the metoprolol is ordered twice a day but he only takes it once a day because he is not home enough.   5/24/2018 at 0900   • omeprazole (priLOSEC) 20 MG capsule Take 20 mg by mouth Daily.   5/24/2018 at 0900   • ondansetron (ZOFRAN) 8 MG tablet Take  by mouth Every 8 (Eight) Hours As Needed for Nausea or Vomiting.   5/24/2018 at 0900   • spironolactone (ALDACTONE) 25 MG tablet Take 25 mg by mouth Daily.   5/24/2018 at 0900   • tamsulosin (FLOMAX) 0.4 MG capsule 24 hr capsule Take 1 capsule by mouth Every Night.   5/24/2018 at 0900     Allergies:  Patient has no known allergies.    Review of Systems  Review of Systems   Constitutional: Positive for fatigue. Negative for fever.   Respiratory: Positive for shortness of breath. Negative for cough and wheezing.    Cardiovascular: Negative for chest pain, palpitations and leg swelling.   Gastrointestinal: Negative for abdominal distention, abdominal pain, constipation, nausea and vomiting.   Genitourinary: Negative for dysuria, frequency, penile swelling and urgency.   Musculoskeletal: Positive for arthralgias and back pain. Negative for joint swelling.   Skin: Positive for pallor. Negative for color change.   Neurological: Positive for dizziness, weakness and light-headedness. Negative for seizures, syncope, numbness and headaches.   Psychiatric/Behavioral: Negative for agitation, behavioral problems and confusion.   All other systems reviewed and are negative.      Objective     Vital Signs  Temp:  [96.1 °F (35.6 °C)-97 °F (36.1 °C)] 96.7 °F (35.9 °C)  Heart Rate:  [] 80  Resp:  [15-18] 18  BP: ()/(51-72) 109/72    Physical Exam:  Physical Exam   Constitutional: He is oriented to person, place, and time. He  appears well-developed and well-nourished. No distress.   HENT:   Head: Normocephalic and atraumatic.   Eyes: EOM are normal. Pupils are equal, round, and reactive to light. No scleral icterus.   Neck: Normal range of motion. Neck supple.   Cardiovascular: Normal rate.  An irregular rhythm present.   Pulmonary/Chest: Effort normal and breath sounds normal. No respiratory distress.   Abdominal: Soft. Bowel sounds are normal. He exhibits no distension. There is no tenderness.   Musculoskeletal: Normal range of motion. He exhibits no edema or tenderness.   Neurological: He is alert and oriented to person, place, and time. No cranial nerve deficit.   Skin: Skin is warm and dry. No rash noted. No erythema. There is pallor.   Psychiatric: He has a normal mood and affect. His behavior is normal.   Vitals reviewed.      Results Review:   Lab Results (last 24 hours)     Procedure Component Value Units Date/Time    POC Glucose Once [[id number]]  (Normal) Collected:  05/25/18 0741    Specimen:  Blood Updated:  05/25/18 0817     Glucose 119 mg/dL      Comment: RN NotifiedMeter: [id number]: [id number] MUSTAFA EMMA       POC Glucose Once [[id number]]  (Abnormal) Collected:  05/24/18 2349    Specimen:  Blood Updated:  05/25/18 0812     Glucose 220 (H) mg/dL      Comment: Sliding Scale AdminMeter: [id number]: [id number] LADAN GREEN       Blood Culture - Blood, [[id number]]  (Normal) Collected:  05/24/18 1807    Specimen:  Blood from Arm, Right Updated:  05/25/18 0715     Blood Culture No growth at less than 24 hours    Blood Culture - Blood, [[id number]]  (Normal) Collected:  05/24/18 1815    Specimen:  Blood from Arm, Left Updated:  05/25/18 0715     Blood Culture No growth at less than 24 hours    Basic Metabolic Panel [[id number]]  (Abnormal) Collected:  05/25/18 0536    Specimen:  Blood Updated:  05/25/18 0553     Glucose 78 mg/dL      BUN 25 (H) mg/dL      Creatinine 1.12 mg/dL      Sodium 140 mmol/L       Potassium 3.9 mmol/L      Chloride 108 mmol/L      CO2 24.0 mmol/L      Calcium 8.5 mg/dL      eGFR Non African Amer 66 mL/min/1.73      BUN/Creatinine Ratio 22.3     Anion Gap 8.0 mmol/L     Urinalysis With / Microscopic If Indicated (No Culture) - Urine, Clean Catch [[id number]]  (Abnormal) Collected:  05/24/18 2310    Specimen:  Urine from Urine, Clean Catch Updated:  05/24/18 2318     Color, UA Yellow     Appearance, UA Clear     pH, UA 5.5     Specific Gravity, UA 1.024     Glucose, UA >=1000 mg/dL (3+) (A)     Ketones, UA Negative     Bilirubin, UA Negative     Blood, UA Negative     Protein, UA Negative     Leuk Esterase, UA Negative     Nitrite, UA Negative     Urobilinogen, UA 1.0 E.U./dL    Narrative:       Urine microscopic not indicated.    Lactic Acid, Reflex [[id number]]  (Abnormal) Collected:  05/24/18 2210    Specimen:  Blood Updated:  05/24/18 2226     Lactate 2.1 (C) mmol/L     Lactic Acid, Reflex Timer (This will reflex a repeat order 3-3:15 hours after ordered.) [[id number]] Collected:  05/24/18 1823    Specimen:  Blood Updated:  05/24/18 2200     Extra Tube Hold for add-ons.     Comment: Auto resulted.       [place] Draw [[id number]] Collected:  05/24/18 1823    Specimen:  Blood Updated:  05/24/18 1930    Narrative:       The following orders were created for panel order [place] Draw.  Procedure                               Abnormality         Status                     ---------                               -----------         ------                     Light Blue Top[[id number]]                                   Final result               Green Top (Gel)[[id number]]                                  Final result               Lavender Top[[id number]]                                     Final result               Gold Top - SST[[id number]]                                   Final result                 Please view results for these tests on the individual orders.    Light Blue Top [[id number]]  Collected:  05/24/18 1823    Specimen:  Blood Updated:  05/24/18 1930     Extra Tube hold for add-on     Comment: Auto resulted       Green Top (Gel) [[id number]] Collected:  05/24/18 1823    Specimen:  Blood Updated:  05/24/18 1930     Extra Tube Hold for add-ons.     Comment: Auto resulted.       Lavender Top [[id number]] Collected:  05/24/18 1823    Specimen:  Blood Updated:  05/24/18 1930     Extra Tube hold for add-on     Comment: Auto resulted       Gold Top - SST [[id number]] Collected:  05/24/18 1823    Specimen:  Blood Updated:  05/24/18 1930     Extra Tube Hold for add-ons.     Comment: Auto resulted.       Comprehensive Metabolic Panel [[id number]]  (Abnormal) Collected:  05/24/18 1823    Specimen:  Blood Updated:  05/24/18 1920     Glucose 191 (H) mg/dL      BUN 32 (H) mg/dL      Creatinine 1.78 (H) mg/dL      Sodium 135 (L) mmol/L      Potassium 4.2 mmol/L      Chloride 97 mmol/L      CO2 26.0 mmol/L      Calcium 9.3 mg/dL      Total Protein 7.1 g/dL      Albumin 4.20 g/dL      ALT (SGPT) 25 U/L      AST (SGOT) 17 U/L      Alkaline Phosphatase 64 U/L      Total Bilirubin 0.6 mg/dL      eGFR Non African Amer 38 (L) mL/min/1.73      Globulin 2.9 gm/dL      A/G Ratio 1.4 g/dL      BUN/Creatinine Ratio 18.0     Anion Gap 12.0 mmol/L     Troponin [[id number]]  (Normal) Collected:  05/24/18 1823    Specimen:  Blood Updated:  05/24/18 1903     Troponin I 0.033 ng/mL     BNP [[id number]]  (Normal) Collected:  05/24/18 1823    Specimen:  Blood Updated:  05/24/18 1903     proBNP 426.0 pg/mL     Lactic Acid, Plasma [[id number]]  (Abnormal) Collected:  05/24/18 1823    Specimen:  Blood Updated:  05/24/18 1854     Lactate 2.4 (C) mmol/L     Magnesium [[id number]]  (Normal) Collected:  05/24/18 1823    Specimen:  Blood Updated:  05/24/18 1850     Magnesium 1.8 mg/dL     Protime-INR [[id number]]  (Normal) Collected:  05/24/18 1823    Specimen:  Blood Updated:  05/24/18 1847     Protime 14.1 Seconds      INR 1.11    Narrative:        Therapeutic range for most indications is 2.0-3.0 INR,  or 2.5-3.5 for mechanical heart valves.    CBC & Differential [[id number]] Collected:  05/24/18 1823    Specimen:  Blood Updated:  05/24/18 1831    Narrative:       The following orders were created for panel order CBC & Differential.  Procedure                               Abnormality         Status                     ---------                               -----------         ------                     CBC Auto Differential[[id number]]        Abnormal            Final result                 Please view results for these tests on the individual orders.    CBC Auto Differential [[id number]]  (Abnormal) Collected:  05/24/18 1823    Specimen:  Blood Updated:  05/24/18 1831     WBC 9.01 10*3/mm3      RBC 4.59 10*6/mm3      Hemoglobin 14.7 g/dL      Hematocrit 40.9 %      MCV 89.1 fL      MCH 32.0 pg      MCHC 35.9 g/dL      RDW 13.0 %      RDW-SD 42.3 fl      MPV 10.3 fL      Platelets 188 10*3/mm3      Neutrophil % 61.6 %      Lymphocyte % 29.7 %      Monocyte % 6.3 %      Eosinophil % 1.8 %      Basophil % 0.3 %      Immature Grans % 0.3 %      Neutrophils, Absolute 5.54 10*3/mm3      Lymphocytes, Absolute 2.68 10*3/mm3      Monocytes, Absolute 0.57 10*3/mm3      Eosinophils, Absolute 0.16 10*3/mm3      Basophils, Absolute 0.03 10*3/mm3      Immature Grans, Absolute 0.03 (H) 10*3/mm3           Imaging Results (last 24 hours)     Procedure Component Value Units Date/Time    XR Chest 1 View [[id number]] Collected:  05/24/18 2023     Updated:  05/24/18 2042    Narrative:         EXAM:         Radiograph(s), Chest   VIEWS:   Portable ; 1       DATE/TIME:  5/24/2018 8:41 PM CDT                INDICATION:   dizziness    COMPARISON:  CXR: none             FINDINGS:             - lines/tubes:    none     - cardiac:         size within normal limits         - mediastinum: contour within normal limits         - lungs:         no focal air space process,  pulmonary  interstitial edema, nodule(s)/mass             - pleura:         no evidence of  fluid                  - osseous:         unremarkable for age                  - misc.:         Impression:       CONCLUSION:        1. No evidence of an active cardiopulmonary process.    Electronically signed by:  FANY Fuller MD  5/24/2018 8:41  PM CDT Workstation: [id number]          Assessment/Plan     Principal Problem:    Hypotension  Active Problems:    Chronic atrial fibrillation    CKD (chronic kidney disease) stage 3, GFR 30-59 ml/min    Diabetes mellitus    Atherosclerosis    Chronic bronchitis    Hold the antihypertensive medications for now, gentle IV hydration, obtain cardiac Echo, repeat BMP and ECG in AM.    Edmund Quiros MD  05/25/18  9:37 AM

## 2018-05-25 NOTE — ED NOTES
I called [facility] to give report and spoke with SHI Hinton. She stated that they could not take report at this time because they didn't have any rooms clean.      Anitha Anderson RN  05/24/18 9913

## 2018-05-26 PROBLEM — I50.22 CHRONIC SYSTOLIC HEART FAILURE (HCC): Status: ACTIVE | Noted: 2018-05-26

## 2018-05-26 LAB
ALBUMIN SERPL-MCNC: 3.5 G/DL (ref 3.4–4.8)
ALBUMIN/GLOB SERPL: 1.5 G/DL (ref 1.1–1.8)
ALP SERPL-CCNC: 67 U/L (ref 38–126)
ALT SERPL W P-5'-P-CCNC: 27 U/L (ref 21–72)
ANION GAP SERPL CALCULATED.3IONS-SCNC: 7 MMOL/L (ref 5–15)
AST SERPL-CCNC: 16 U/L (ref 17–59)
BASOPHILS # BLD AUTO: 0.04 10*3/MM3 (ref 0–0.2)
BASOPHILS NFR BLD AUTO: 0.7 % (ref 0–2)
BILIRUB SERPL-MCNC: 0.4 MG/DL (ref 0.2–1.3)
BUN BLD-MCNC: 18 MG/DL (ref 7–21)
BUN/CREAT SERPL: 18.6 (ref 7–25)
CALCIUM SPEC-SCNC: 8.7 MG/DL (ref 8.4–10.2)
CHLORIDE SERPL-SCNC: 106 MMOL/L (ref 95–110)
CO2 SERPL-SCNC: 26 MMOL/L (ref 22–31)
CREAT BLD-MCNC: 0.97 MG/DL (ref 0.7–1.3)
DEPRECATED RDW RBC AUTO: 42.6 FL (ref 35.1–43.9)
EOSINOPHIL # BLD AUTO: 0.17 10*3/MM3 (ref 0–0.7)
EOSINOPHIL NFR BLD AUTO: 2.8 % (ref 0–7)
ERYTHROCYTE [DISTWIDTH] IN BLOOD BY AUTOMATED COUNT: 13 % (ref 11.5–14.5)
GFR SERPL CREATININE-BSD FRML MDRD: 77 ML/MIN/1.73 (ref 49–113)
GLOBULIN UR ELPH-MCNC: 2.4 GM/DL (ref 2.3–3.5)
GLUCOSE BLD-MCNC: 145 MG/DL (ref 60–100)
GLUCOSE BLDC GLUCOMTR-MCNC: 120 MG/DL (ref 70–130)
GLUCOSE BLDC GLUCOMTR-MCNC: 164 MG/DL (ref 70–130)
GLUCOSE BLDC GLUCOMTR-MCNC: 169 MG/DL (ref 70–130)
GLUCOSE BLDC GLUCOMTR-MCNC: 172 MG/DL (ref 70–130)
GLUCOSE BLDC GLUCOMTR-MCNC: 89 MG/DL (ref 70–130)
HCT VFR BLD AUTO: 38.5 % (ref 39–49)
HGB BLD-MCNC: 13.6 G/DL (ref 13.7–17.3)
IMM GRANULOCYTES # BLD: 0.01 10*3/MM3 (ref 0–0.02)
IMM GRANULOCYTES NFR BLD: 0.2 % (ref 0–0.5)
LYMPHOCYTES # BLD AUTO: 2.64 10*3/MM3 (ref 0.6–4.2)
LYMPHOCYTES NFR BLD AUTO: 43.2 % (ref 10–50)
MCH RBC QN AUTO: 31.6 PG (ref 26.5–34)
MCHC RBC AUTO-ENTMCNC: 35.3 G/DL (ref 31.5–36.3)
MCV RBC AUTO: 89.5 FL (ref 80–98)
MONOCYTES # BLD AUTO: 0.35 10*3/MM3 (ref 0–0.9)
MONOCYTES NFR BLD AUTO: 5.7 % (ref 0–12)
NEUTROPHILS # BLD AUTO: 2.9 10*3/MM3 (ref 2–8.6)
NEUTROPHILS NFR BLD AUTO: 47.4 % (ref 37–80)
PLATELET # BLD AUTO: 161 10*3/MM3 (ref 150–450)
PMV BLD AUTO: 10.2 FL (ref 8–12)
POTASSIUM BLD-SCNC: 4.4 MMOL/L (ref 3.5–5.1)
PROT SERPL-MCNC: 5.9 G/DL (ref 6.3–8.6)
RBC # BLD AUTO: 4.3 10*6/MM3 (ref 4.37–5.74)
SODIUM BLD-SCNC: 139 MMOL/L (ref 137–145)
WBC NRBC COR # BLD: 6.11 10*3/MM3 (ref 3.2–9.8)

## 2018-05-26 PROCEDURE — 63710000001 INSULIN ASPART PER 5 UNITS: Performed by: INTERNAL MEDICINE

## 2018-05-26 PROCEDURE — 93010 ELECTROCARDIOGRAM REPORT: CPT | Performed by: INTERNAL MEDICINE

## 2018-05-26 PROCEDURE — 85025 COMPLETE CBC W/AUTO DIFF WBC: CPT | Performed by: FAMILY MEDICINE

## 2018-05-26 PROCEDURE — G0378 HOSPITAL OBSERVATION PER HR: HCPCS

## 2018-05-26 PROCEDURE — 80053 COMPREHEN METABOLIC PANEL: CPT | Performed by: FAMILY MEDICINE

## 2018-05-26 PROCEDURE — 93005 ELECTROCARDIOGRAM TRACING: CPT | Performed by: FAMILY MEDICINE

## 2018-05-26 PROCEDURE — 82962 GLUCOSE BLOOD TEST: CPT

## 2018-05-26 RX ORDER — SPIRONOLACTONE 25 MG/1
25 TABLET ORAL DAILY
Status: DISCONTINUED | OUTPATIENT
Start: 2018-05-26 | End: 2018-05-27 | Stop reason: HOSPADM

## 2018-05-26 RX ORDER — METOPROLOL TARTRATE 50 MG/1
50 TABLET, FILM COATED ORAL EVERY 12 HOURS SCHEDULED
Status: DISCONTINUED | OUTPATIENT
Start: 2018-05-26 | End: 2018-05-27 | Stop reason: HOSPADM

## 2018-05-26 RX ADMIN — METOPROLOL TARTRATE 50 MG: 50 TABLET ORAL at 21:22

## 2018-05-26 RX ADMIN — GABAPENTIN 300 MG: 300 CAPSULE ORAL at 08:37

## 2018-05-26 RX ADMIN — INSULIN ASPART 10 UNITS: 100 INJECTION, SOLUTION INTRAVENOUS; SUBCUTANEOUS at 17:26

## 2018-05-26 RX ADMIN — INSULIN ASPART 10 UNITS: 100 INJECTION, SOLUTION INTRAVENOUS; SUBCUTANEOUS at 08:38

## 2018-05-26 RX ADMIN — APIXABAN 5 MG: 5 TABLET, FILM COATED ORAL at 21:22

## 2018-05-26 RX ADMIN — APIXABAN 5 MG: 5 TABLET, FILM COATED ORAL at 08:37

## 2018-05-26 RX ADMIN — INSULIN ASPART 3 UNITS: 100 INJECTION, SOLUTION INTRAVENOUS; SUBCUTANEOUS at 07:35

## 2018-05-26 RX ADMIN — TAMSULOSIN HYDROCHLORIDE 0.4 MG: 0.4 CAPSULE ORAL at 21:22

## 2018-05-26 RX ADMIN — METOPROLOL TARTRATE 50 MG: 50 TABLET ORAL at 10:43

## 2018-05-26 RX ADMIN — SPIRONOLACTONE 25 MG: 25 TABLET ORAL at 10:43

## 2018-05-26 RX ADMIN — INSULIN ASPART 3 UNITS: 100 INJECTION, SOLUTION INTRAVENOUS; SUBCUTANEOUS at 21:22

## 2018-05-26 RX ADMIN — INSULIN ASPART 3 UNITS: 100 INJECTION, SOLUTION INTRAVENOUS; SUBCUTANEOUS at 17:32

## 2018-05-26 RX ADMIN — ATORVASTATIN CALCIUM 80 MG: 40 TABLET, FILM COATED ORAL at 08:37

## 2018-05-26 RX ADMIN — LEVOTHYROXINE SODIUM 112 MCG: 112 TABLET ORAL at 05:43

## 2018-05-26 RX ADMIN — GABAPENTIN 300 MG: 300 CAPSULE ORAL at 21:22

## 2018-05-26 NOTE — SIGNIFICANT NOTE
05/26/18 1137   Rehab Time/Intention   Evaluation Not Performed other (see comments)  (RN states to hold today, HR up to 190 while ambulating, check back tomorrow)   Rehab Treatment   Discipline physical therapist   Recommendation   PT - Next Appointment 05/27/18

## 2018-05-26 NOTE — PROGRESS NOTES
[facility] Medicine Services  INPATIENT PROGRESS NOTE    Length of Stay: 0  Date of Admission: 5/24/2018  Primary Care Physician: Angélica So MD    Subjective     Chief Complaint   Patient presents with   • Dizziness     HPI:  67 year old male who was presented to ED with complain of having dizziness for 4 days. Pt has history of A.fib, and it has been rate control.     5/25/18: pt is doing well today, dizziness is better, BP has improved.     5/26/18: exertional tachycardia, HR in 190s, dropped down to 100, after rest. Discussed with Dr. Cyr, will start back on BB, monitor.     Review of Systems   Constitutional: Positive for activity change, appetite change and fatigue. Negative for chills and fever.   HENT: Negative for congestion and rhinorrhea.    Respiratory: Negative for choking, shortness of breath and wheezing.    Cardiovascular: Positive for palpitations. Negative for chest pain and leg swelling.   Gastrointestinal: Negative for abdominal pain, blood in stool, constipation, diarrhea, nausea and vomiting.   Genitourinary: Negative for dysuria and frequency.   Musculoskeletal: Negative for back pain.   Neurological: Negative for dizziness and light-headedness.   Hematological: Negative for adenopathy. Does not bruise/bleed easily.   Psychiatric/Behavioral: Negative for agitation, behavioral problems and confusion.        All pertinent negatives and positives are as above. All other systems have been reviewed and are negative unless otherwise stated.     Objective      Vital Sign Min/Max for last 24 hours  Temp  Min: 96.7 °F (35.9 °C)  Max: 97.7 °F (36.5 °C)   BP  Min: 91/66  Max: 130/68   Pulse  Min: 74  Max: 190   Resp  Min: 16  Max: 18   SpO2  Min: 96 %  Max: 98 %   No Data Recorded   No Data Recorded         Physical Exam   Constitutional: He is oriented to person, place, and time. He appears well-developed and well-nourished.   HENT:   Head:  Normocephalic and atraumatic.   Eyes: Pupils are equal, round, and reactive to light.   Neck: Normal range of motion.   Cardiovascular: Normal heart sounds.  An irregularly irregular rhythm present. Tachycardia present.    Pulmonary/Chest: Effort normal and breath sounds normal. No respiratory distress. He has no wheezes.   Abdominal: Soft. Bowel sounds are normal.   Musculoskeletal: Normal range of motion.   Neurological: He is alert and oriented to person, place, and time.   Skin: Skin is warm.   Psychiatric: He has a normal mood and affect. His behavior is normal.   Vitals reviewed.      Current Facility-Administered Medications   Medication Dose Route Frequency Provider Last Rate Last Dose   • apixaban (ELIQUIS) tablet 5 mg  5 mg Oral Q12H Edmund Quiros MD   5 mg at 05/26/18 0837   • atorvastatin (LIPITOR) tablet 80 mg  80 mg Oral Daily Edmund Quiros MD   80 mg at 05/26/18 0837   • dextrose (D50W) solution 25 g  25 g Intravenous Q15 Min PRN Edmund Quiros MD       • dextrose (GLUTOSE) oral gel 15 g  15 g Oral Q15 Min PRN Edmund Quiros MD       • gabapentin (NEURONTIN) capsule 300 mg  300 mg Oral Q12H Edmund Quiros MD   300 mg at 05/26/18 0837   • glucagon (human recombinant) (GLUCAGEN DIAGNOSTIC) injection 1 mg  1 mg Subcutaneous PRN Edmund Quiros MD       • insulin aspart (novoLOG) injection 0-14 Units  0-14 Units Subcutaneous 4x Daily AC & at Bedtime Edmund Quiros MD   3 Units at 05/26/18 0735   • insulin aspart (novoLOG) injection 10 Units  10 Units Subcutaneous TID With Meals Edmund Quiros MD   10 Units at 05/26/18 0838   • levothyroxine (SYNTHROID, LEVOTHROID) tablet 112 mcg  112 mcg Oral Q AM Edmund Quiros MD   112 mcg at 05/26/18 0543   • metoprolol tartrate (LOPRESSOR) tablet 50 mg  50 mg Oral Q12H Rambo Pritchard MD       • sodium chloride 0.9 % flush 1-10 mL  1-10 mL Intravenous PRN Edmund Quiros MD       • sodium chloride 0.9 % flush 10 mL  10 mL Intravenous PRN Irvin Real MD       •  spironolactone (ALDACTONE) tablet 25 mg  25 mg Oral Daily Rambo Pritchard MD       • tamsulosin (FLOMAX) 24 hr capsule 0.4 mg  0.4 mg Oral Nightly Edmund Quiros MD   0.4 mg at 05/25/18 2100           Results Review:  I have reviewed the labs, radiology results, and diagnostic studies.    Laboratory Data:     Results from last 7 days  Lab Units 05/26/18  0556 05/25/18  0536 05/24/18  1823   SODIUM mmol/L 139 140 135*   POTASSIUM mmol/L 4.4 3.9 4.2   CHLORIDE mmol/L 106 108 97   CO2 mmol/L 26.0 24.0 26.0   BUN mg/dL 18 25* 32*   CREATININE mg/dL 0.97 1.12 1.78*   GLUCOSE mg/dL 145* 78 191*   CALCIUM mg/dL 8.7 8.5 9.3   BILIRUBIN mg/dL 0.4  --  0.6   ALK PHOS U/L 67  --  64   ALT (SGPT) U/L 27  --  25   AST (SGOT) U/L 16*  --  17   ANION GAP mmol/L 7.0 8.0 12.0     Estimated Creatinine Clearance: 91.3 mL/min (by C-G formula based on SCr of 0.97 mg/dL).    Results from last 7 days  Lab Units 05/24/18  1823   MAGNESIUM mg/dL 1.8           Results from last 7 days  Lab Units 05/26/18  0556 05/24/18  1823   WBC 10*3/mm3 6.11 9.01   HEMOGLOBIN g/dL 13.6* 14.7   HEMATOCRIT % 38.5* 40.9   PLATELETS 10*3/mm3 161 188       Results from last 7 days  Lab Units 05/24/18  1823   INR  1.11           Culture Data:   Blood Culture   Date Value Ref Range Status   05/24/2018 No growth at 24 hours  Preliminary   05/24/2018 No growth at 24 hours  Preliminary     No results found for: URINECX  No results found for: RESPCX  No results found for: WOUNDCX  No results found for: STOOLCX  No components found for: BODYFLD      Radiology Data:   Imaging Results (last 24 hours)     Procedure Component Value Units Date/Time    US Carotid Bilateral [[id number]] Collected:  05/25/18 1245     Updated:  05/25/18 1530    Narrative:       Doppler duplex carotid ultrasound.       HISTORY:Dizziness. Transient cerebral ischemia.    COMPARISON: None       TECHNIQUE: Grayscale, color Doppler and pulsed Doppler  interrogation of the common, internal and external  carotid  arteries was performed with special attention to the internal  carotids. Limited evaluation of the vertebral and subclavian  arteries was also performed.    FINDINGS:     On the right, peak systolic internal carotid artery velocity 85.4  cm/s. ICA/CCA ratio 0.9. No stenosis.    On the left, peak systolic internal carotid artery velocity 66.9  cm/s. ICA over CCA ratio 0.7. No stenosis.    The flow velocities within each internal carotid artery are  within normal limits. Each vertebral artery has antegrade flow.  Minimal partially calcified heterogenous plaque at both carotid  bifurcations.      Impression:       CONCLUSION: No hemodynamically significant stenoses are  identified within either internal carotid artery.    Electronically signed by:  Khurram Fulton MD  5/25/2018 3:29 PM CDT  Workstation: [id number]          I have reviewed the patient current medications.     Assessment/Plan     Active Hospital Problems (** Indicates Principal Problem)    Diagnosis Date Noted   • **Hypotension [I95.9] 05/24/2018   • Chronic systolic heart failure [I50.22] 05/26/2018   • Atherosclerosis [I70.90] 05/24/2018   • Chronic bronchitis [J42] 05/24/2018   • CKD (chronic kidney disease) stage 3, GFR 30-59 ml/min [N18.3] 05/24/2018   • Chronic atrial fibrillation [I48.2] 05/24/2018   • Diabetes mellitus [E11.9]       Resolved Hospital Problems    Diagnosis Date Noted Date Resolved   No resolved problems to display.     1. Near syncope: multifactorial, he is rate control A. Fib. Pt is doing well after IV hydration. BP has improved. US carotid is pending. Echo is pending.   2. Hypotension: likely to many antihypertensive medications. IVF BP is doing better.   3. A. Fib: rate control, anticoagulated with eliquis. Will start back on the BB at a lower dose, discussed with Dr. Cyr he agree.   4. HTN: currently hypotensive.   5. DM: continue with home Insulin dose. Monitor Blood sugar.   6. Hypothyroidism: levothyroxine   7. JOHN:  resolved.  8. Chronic systolic dysfunction: EF of 43%, continue with spironolactone. Lasix is on hold due to hypotension.          Discharge Planning: I expect patient to be discharged to home in 1-2 days.      DVT Prophylaxis: eliquis.               This document has been electronically signed by Rambo Pritchard MD on May 26, 2018 10:38 AM

## 2018-05-26 NOTE — PLAN OF CARE
Problem: Fall Risk (Adult)  Goal: Identify Related Risk Factors and Signs and Symptoms  Outcome: Outcome(s) achieved Date Met: 05/26/18 05/25/18 0356   Fall Risk (Adult)   Related Risk Factors (Fall Risk) history of falls;homeostatic imbalance   Signs and Symptoms (Fall Risk) presence of risk factors     Goal: Absence of Fall  Outcome: Ongoing (interventions implemented as appropriate)      Problem: Syncope (Adult)  Goal: Physical Safety/Health Maintenance  Outcome: Ongoing (interventions implemented as appropriate)    Goal: Optimal Emotional/Functional [place]  Outcome: Ongoing (interventions implemented as appropriate)      Problem: Patient Care Overview  Goal: Plan of Care Review  Outcome: Ongoing (interventions implemented as appropriate)    Goal: Individualization and Mutuality  Outcome: Ongoing (interventions implemented as appropriate)    Goal: Discharge Needs Assessment  Outcome: Ongoing (interventions implemented as appropriate)

## 2018-05-26 NOTE — SIGNIFICANT NOTE
05/26/18 1011   Rehab Time/Intention   Evaluation Not Performed (Per discussion with SHI Hercules, pt performed amb with HR increasing 190; advising to hold therapy at this time until HR controlled. Will f/u as appropriate. )   Rehab Treatment   Discipline occupational therapist

## 2018-05-27 VITALS
DIASTOLIC BLOOD PRESSURE: 84 MMHG | OXYGEN SATURATION: 99 % | HEART RATE: 73 BPM | TEMPERATURE: 96.6 F | HEIGHT: 70 IN | BODY MASS INDEX: 27.2 KG/M2 | WEIGHT: 190 LBS | RESPIRATION RATE: 18 BRPM | SYSTOLIC BLOOD PRESSURE: 121 MMHG

## 2018-05-27 LAB
BASOPHILS # BLD AUTO: 0.04 10*3/MM3 (ref 0–0.2)
BASOPHILS NFR BLD AUTO: 0.5 % (ref 0–2)
DEPRECATED RDW RBC AUTO: 43.8 FL (ref 35.1–43.9)
EOSINOPHIL # BLD AUTO: 0.19 10*3/MM3 (ref 0–0.7)
EOSINOPHIL NFR BLD AUTO: 2.6 % (ref 0–7)
ERYTHROCYTE [DISTWIDTH] IN BLOOD BY AUTOMATED COUNT: 13.2 % (ref 11.5–14.5)
GLUCOSE BLDC GLUCOMTR-MCNC: 162 MG/DL (ref 70–130)
GLUCOSE BLDC GLUCOMTR-MCNC: 99 MG/DL (ref 70–130)
HCT VFR BLD AUTO: 38.4 % (ref 39–49)
HGB BLD-MCNC: 13.6 G/DL (ref 13.7–17.3)
HOLD SPECIMEN: NORMAL
IMM GRANULOCYTES # BLD: 0.01 10*3/MM3 (ref 0–0.02)
IMM GRANULOCYTES NFR BLD: 0.1 % (ref 0–0.5)
LYMPHOCYTES # BLD AUTO: 3.21 10*3/MM3 (ref 0.6–4.2)
LYMPHOCYTES NFR BLD AUTO: 43.8 % (ref 10–50)
MCH RBC QN AUTO: 32.1 PG (ref 26.5–34)
MCHC RBC AUTO-ENTMCNC: 35.4 G/DL (ref 31.5–36.3)
MCV RBC AUTO: 90.6 FL (ref 80–98)
MONOCYTES # BLD AUTO: 0.46 10*3/MM3 (ref 0–0.9)
MONOCYTES NFR BLD AUTO: 6.3 % (ref 0–12)
NEUTROPHILS # BLD AUTO: 3.42 10*3/MM3 (ref 2–8.6)
NEUTROPHILS NFR BLD AUTO: 46.7 % (ref 37–80)
NRBC BLD MANUAL-RTO: 0 /100 WBC (ref 0–0)
PLATELET # BLD AUTO: 177 10*3/MM3 (ref 150–450)
PMV BLD AUTO: 10.1 FL (ref 8–12)
RBC # BLD AUTO: 4.24 10*6/MM3 (ref 4.37–5.74)
WBC NRBC COR # BLD: 7.33 10*3/MM3 (ref 3.2–9.8)

## 2018-05-27 PROCEDURE — 63710000001 INSULIN ASPART PER 5 UNITS: Performed by: INTERNAL MEDICINE

## 2018-05-27 PROCEDURE — 97530 THERAPEUTIC ACTIVITIES: CPT | Performed by: PHYSICAL THERAPIST

## 2018-05-27 PROCEDURE — 97162 PT EVAL MOD COMPLEX 30 MIN: CPT | Performed by: PHYSICAL THERAPIST

## 2018-05-27 PROCEDURE — G8979 MOBILITY GOAL STATUS: HCPCS | Performed by: PHYSICAL THERAPIST

## 2018-05-27 PROCEDURE — 85025 COMPLETE CBC W/AUTO DIFF WBC: CPT | Performed by: FAMILY MEDICINE

## 2018-05-27 PROCEDURE — G8980 MOBILITY D/C STATUS: HCPCS | Performed by: PHYSICAL THERAPIST

## 2018-05-27 PROCEDURE — G0378 HOSPITAL OBSERVATION PER HR: HCPCS

## 2018-05-27 PROCEDURE — G8978 MOBILITY CURRENT STATUS: HCPCS | Performed by: PHYSICAL THERAPIST

## 2018-05-27 PROCEDURE — 82962 GLUCOSE BLOOD TEST: CPT

## 2018-05-27 RX ORDER — METOPROLOL TARTRATE 50 MG/1
50 TABLET, FILM COATED ORAL EVERY 12 HOURS SCHEDULED
Qty: 30 TABLET | Refills: 0 | Status: SHIPPED | OUTPATIENT
Start: 2018-05-27

## 2018-05-27 RX ADMIN — METOPROLOL TARTRATE 50 MG: 50 TABLET ORAL at 08:43

## 2018-05-27 RX ADMIN — INSULIN ASPART 10 UNITS: 100 INJECTION, SOLUTION INTRAVENOUS; SUBCUTANEOUS at 08:44

## 2018-05-27 RX ADMIN — ATORVASTATIN CALCIUM 80 MG: 40 TABLET, FILM COATED ORAL at 08:43

## 2018-05-27 RX ADMIN — APIXABAN 5 MG: 5 TABLET, FILM COATED ORAL at 08:43

## 2018-05-27 RX ADMIN — SPIRONOLACTONE 25 MG: 25 TABLET ORAL at 08:43

## 2018-05-27 RX ADMIN — LEVOTHYROXINE SODIUM 112 MCG: 112 TABLET ORAL at 05:35

## 2018-05-27 RX ADMIN — INSULIN ASPART 3 UNITS: 100 INJECTION, SOLUTION INTRAVENOUS; SUBCUTANEOUS at 08:44

## 2018-05-27 RX ADMIN — GABAPENTIN 300 MG: 300 CAPSULE ORAL at 08:49

## 2018-05-27 RX ADMIN — INSULIN ASPART 10 UNITS: 100 INJECTION, SOLUTION INTRAVENOUS; SUBCUTANEOUS at 11:15

## 2018-05-27 NOTE — DISCHARGE SUMMARY
[address] [address]  T - [phone]     DISCHARGE SUMMARY         PATIENT  DEMOGRAPHICS   PATIENT NAME: Austen Brito                      PHYSICIAN: Rambo Pritchard MD  : 1950  MRN: [id number]    ADMISSION/DISCHARGE INFO   ADMISSION DATE: 2018   DISCHARGE DATE: 18    ADMISSION DIAGNOSES: Hypotension, unspecified hypotension type [I95.9]  Atrial fibrillation, unspecified type [I48.91]  DISCHARGE DIAGNOSES:     Principal Problem:    Hypotension  Active Problems:    Atherosclerosis    Chronic bronchitis    CKD (chronic kidney disease) stage 3, GFR 30-59 ml/min    Diabetes mellitus    Chronic atrial fibrillation    Chronic systolic heart failure      SERVICE:  Medicine       CONSULTS   Consult Orders (all)     Start     Ordered    18  Hospitalist (on-call MD unless specified)  Once     Specialty:  Hospitalist  Provider:  Edmund Quiros MD    18          PROCEDURES     Imaging Results (last 24 hours)     ** No results found for the last 24 hours. **          Xr Chest 1 View    Result Date: 2018  Narrative: EXAM:         Radiograph(s), Chest VIEWS:   Portable ; 1     DATE/TIME:  2018 8:41 PM CDT            INDICATION:   dizziness  COMPARISON:  CXR: none         FINDINGS:         - lines/tubes:    none   - cardiac:         size within normal limits       - mediastinum: contour within normal limits       - lungs:         no focal air space process, pulmonary interstitial edema, nodule(s)/mass           - pleura:         no evidence of  fluid                - osseous:         unremarkable for age                - misc.:        Impression: CONCLUSION:    1. No evidence of an active cardiopulmonary process.                                  Electronically signed by:  FANY Fuller MD  2018 8:41 PM CDT Workstation: [id number]    Us Carotid Bilateral    Result Date: 2018  Narrative: Doppler duplex  carotid ultrasound. HISTORY:Dizziness. Transient cerebral ischemia. COMPARISON: None   TECHNIQUE: Grayscale, color Doppler and pulsed Doppler interrogation of the common, internal and external carotid arteries was performed with special attention to the internal carotids. Limited evaluation of the vertebral and subclavian arteries was also performed. FINDINGS: On the right, peak systolic internal carotid artery velocity 85.4 cm/s. ICA/CCA ratio 0.9. No stenosis. On the left, peak systolic internal carotid artery velocity 66.9 cm/s. ICA over CCA ratio 0.7. No stenosis. The flow velocities within each internal carotid artery are within normal limits. Each vertebral artery has antegrade flow. Minimal partially calcified heterogenous plaque at both carotid bifurcations.     Impression: CONCLUSION: No hemodynamically significant stenoses are identified within either internal carotid artery. Electronically signed by:  Khurram Fulton MD  5/25/2018 3:29 PM CDT Workstation: [id number]      HISTORY OF PRESENT ILLNESS   Austen Brito is a 67 y.o. male who was presented to ED with complain of having dizziness for past few days. Pt has history of A. Fib, on anticoagulation, he was found to be on hypotensive as well.     DIAGNOSTIC DATA   Labs     HOSPITAL COURSE   Pt was admitted to medical floor, was started on IVF, his antihypertensive medications were held. BP improved. Pt was then started back on his home medications except lasix, and losartan as his BP is unable to tolerate. US carotid negative, Echo show EF of 43%. Pt follow up with Dr. Mccormick in [place] as his cardiologist. He is on heart failure medications. I have discussed with him to follow up with his cardiologist in 1 week. Pt understands and agree. Pt also has chronic A. Fib, eliquis, and was started back on BB at a lower dose. He ambulated in the [place]abe without any dizziness or tachycardia.       Physical Exam   Constitutional: He is oriented to person, place, and  time. He appears well-developed and well-nourished.   HENT:   Head: Normocephalic and atraumatic.   Eyes: EOM are normal. Pupils are equal, round, and reactive to light.   Neck: Normal range of motion.   Cardiovascular: Normal rate and normal heart sounds.  An irregularly irregular rhythm present.   Pulmonary/Chest: Effort normal and breath sounds normal.   Abdominal: Soft. Bowel sounds are normal. He exhibits no distension. There is no tenderness. There is no guarding.   Musculoskeletal: Normal range of motion. He exhibits no edema.   Neurological: He is alert and oriented to person, place, and time.   Skin: Skin is warm.   Psychiatric: He has a normal mood and affect. His behavior is normal.   Vitals reviewed.         DISCHARGE CONDITION   Stable    DISPOSITION   To home with home health      DISCHARGE MEDICATIONS      Austen Brito   Home Medication Instructions BOONE:[id number]    Printed on:05/27/18 1038   Medication Information                      apixaban (ELIQUIS) 5 MG tablet tablet  Take 5 mg by mouth 2 (Two) Times a Day. Pt states that the eliquis is ordered twice a day but he has only been taking it once a day for the past 3 years because he is not home enough to take it twice a day.             atorvastatin (LIPITOR) 80 MG tablet  Take 80 mg by mouth Daily.             cholecalciferol (VITAMIN D3) 16444 units capsule  Take  by mouth Daily.             gabapentin (NEURONTIN) 300 MG capsule  Take 300 mg by mouth 3 (Three) Times a Day.             insulin aspart (novoLOG) 100 UNIT/ML injection  Inject  under the skin 3 (Three) Times a Day Before Meals.             levothyroxine (SYNTHROID, LEVOTHROID) 112 MCG tablet  Take 112 mcg by mouth Daily.             metoprolol tartrate (LOPRESSOR) 50 MG tablet  Take 1 tablet by mouth Every 12 (Twelve) Hours.             omeprazole (priLOSEC) 20 MG capsule  Take 20 mg by mouth Daily.             ondansetron (ZOFRAN) 8 MG tablet  Take  by mouth Every 8  (Eight) Hours As Needed for Nausea or Vomiting.             spironolactone (ALDACTONE) 25 MG tablet  Take 25 mg by mouth Daily.             tamsulosin (FLOMAX) 0.4 MG capsule 24 hr capsule  Take 1 capsule by mouth Every Night.                   INSTRUCTIONS   Activity:   Activity Instructions     Discharge Activity       As tolerated          Diet:   Diet Instructions     Diet: Cardiac, Regular, Consistent Carbohydrate       Discharge Diet:   Cardiac  Regular  Consistent Carbohydrate             Special Instructions: Patient instructed to call M.D. or return to ED with worsening shortness of breath, chest pain, fever greater than 100.4°F or any other medical concerns.    FOLLOW UP   Additional Instructions for the Follow-ups that You Need to Schedule     Ambulatory Referral to Home Health    As directed      Face to Face Visit Date:  5/27/2018    Follow-up Provider for Plan of Care?:  I treated the patient in an acute care facility and will not continue treatment after discharge.    Follow-up Provider:  ANGÉLICA SO [[id number]]    Reason/Clinical Findings:  transitional visit    Describe mobility limitations that make leaving home difficult:  transitional visit    Nursing/Therapeutic Services Requested:  Other    Frequency:  1 Week 1           Follow-up Information     Angélica So MD Follow up.    Specialty:  Family Medicine  Why:  INFORMATION SENT TO OFFICE PLEASE CALL TUESDAY TO VERIFY  Contact information:  [address] [place] [id number] [phone]             [place] [place] [facility] Follow up in 1 week(s).    Contact information:  Pt has the information to contact the physician office.                 PENDING TEST RESULTS AT DISCHARGE    Order Current Status    Blood Culture - Blood, Preliminary result    Blood Culture - Blood, Preliminary result         TIME   Time: 35 minutes were spent planning this discharge.                      This document has been electronically signed by Rambo  MD Macho on May 27, 2018 10:33 AM

## 2018-05-27 NOTE — PLAN OF CARE
Problem: Fall Risk (Adult)  Goal: Absence of Fall  Outcome: Ongoing (interventions implemented as appropriate)      Problem: Syncope (Adult)  Goal: Physical Safety/Health Maintenance  Outcome: Ongoing (interventions implemented as appropriate)    Goal: Optimal Emotional/Functional [place]  Outcome: Ongoing (interventions implemented as appropriate)

## 2018-05-27 NOTE — PLAN OF CARE
Problem: Patient Care Overview  Goal: Plan of Care Review  Outcome: Outcome(s) achieved Date Met: 05/27/18 05/27/18 0903   Coping/Psychosocial   Plan of Care Reviewed With patient   OTHER   Outcome Summary PT eval completed, was up ambulating in room on arrival , was able to get into bed with HOB down and no rails and then come to sit EOB all independently, was able to ambulate 400 feet no device no LOB independently with heart rate going only to 103 with pulseoxcemetry and per telemetry no signs for concern, able to go up and down 3 steps no rail independently, informed Dr Pritchard of findings, no need for skilled PT at this time     Goal: Discharge Needs Assessment  Outcome: Outcome(s) achieved Date Met: 05/27/18 05/27/18 0903   Discharge Needs Assessment   Concerns to be Addressed no discharge needs identified   Patient/Family Anticipates Transition to home   Patient/Family Anticipated Services at Transition none   Transportation Concerns car, none   Transportation Anticipated family or friend will provide  (family will pick him up here and take to car at another hosp)   Current Discharge Risk lives alone   Discharge Needs Assessment,    Anticipated Discharge Disposition home or self-care

## 2018-05-27 NOTE — PLAN OF CARE
Problem: Fall Risk (Adult)  Goal: Absence of Fall  Outcome: Outcome(s) achieved Date Met: 05/27/18 05/27/18 1026   Fall Risk (Adult)   Absence of Fall achieves outcome       Problem: Syncope (Adult)  Goal: Physical Safety/Health Maintenance  Outcome: Ongoing (interventions implemented as appropriate)    Goal: Optimal Emotional/Functional [place]  Outcome: Outcome(s) achieved Date Met: 05/27/18      Problem: Patient Care Overview  Goal: Plan of Care Review  Outcome: Outcome(s) achieved Date Met: 05/27/18    Goal: Individualization and Mutuality  Outcome: Outcome(s) achieved Date Met: 05/27/18    Goal: Discharge Needs Assessment  Outcome: Outcome(s) achieved Date Met: 05/27/18    Goal: Interprofessional Rounds/Family Conf  Outcome: Outcome(s) achieved Date Met: 05/27/18      Problem: Patient Care Overview  Goal: Plan of Care Review  Outcome: Outcome(s) achieved Date Met: 05/27/18    Goal: Individualization and Mutuality  Outcome: Outcome(s) achieved Date Met: 05/27/18    Goal: Discharge Needs Assessment  Outcome: Outcome(s) achieved Date Met: 05/27/18    Goal: Interprofessional Rounds/Family Conf  Outcome: Outcome(s) achieved Date Met: 05/27/18

## 2018-05-27 NOTE — THERAPY EVALUATION
Acute Care - Physical Therapy Initial Evaluation  [facility]     Patient Name: Austen Brito  : 1950  MRN: [id number]  Today's Date: 2018   Onset of Illness/Injury or Date of Surgery: 18  Date of Referral to PT: 18  Referring Physician: Dr Pritchard      Admit Date: 2018    Visit Dx:     ICD-10-CM ICD-9-CM   1. Hypotension, unspecified hypotension type I95.9 458.9   2. Atrial fibrillation, unspecified type I48.91 427.31     Patient Active Problem List   Diagnosis   • Hypotension   • Atherosclerosis   • Chronic bronchitis   • CKD (chronic kidney disease) stage 3, GFR 30-59 ml/min   • Diabetes mellitus   • Chronic atrial fibrillation   • Chronic systolic heart failure     Past Medical History:   Diagnosis Date   • Atherosclerosis    • Chronic atrial fibrillation    • Chronic bronchitis    • Diabetes mellitus    • GERD (gastroesophageal reflux disease)    • Graves disease    • Hypertension      Past Surgical History:   Procedure Laterality Date   • HERNIA REPAIR          PT ASSESSMENT (last 12 hours)      Physical Therapy Evaluation     Row Name 18 0903          PT Evaluation Time/Intention    Subjective Information complains of;pain  -CB     Document Type evaluation  -CB     Mode of Treatment individual therapy;physical therapy  -CB     Total Evaluation Minutes, Physical Therapy 25  -CB     Patient Effort good  -CB     Symptoms Noted During/After Treatment none  -CB     Row Name 18 0903          General Information    Patient Profile Reviewed? yes  -CB     Onset of Illness/Injury or Date of Surgery 18  -CB     Referring Physician Dr Pritchard  -CB     Patient Observations alert;cooperative;agree to therapy  -CB     Patient/Family Observations no family present  -CB     General Observations of Patient up walking out of bathroom on therapist arrival with IV , SCD and telemetry  -CB     Prior Level of Function independent:;gait;ADL's  -CB     Equipment Currently Used  at Home none   has s/c cane, roll walker and BSC if needed  -CB     Pertinent History of Current Functional Problem dizziness for 4 days  -CB     Existing Precautions/Restrictions fall  -CB     Limitations/Impairments hearing  -CB     Equipment Issued to Patient --   none  -CB     Risks Reviewed patient:;increased discomfort;change in vital signs  -CB     Benefits Reviewed patient:;decrease risk of DVT;improve function  -CB     Row Name 05/27/18 0903          Relationship/Environment    Lives With alone  -CB     Row Name 05/27/18 0903          Resource/Environmental Concerns    Current Living Arrangements home/apartment/condo  -CB     Resource/Environmental Concerns home accessibility  -CB     Row Name 05/27/18 0903          Home Main Entrance    Number of Stairs, Main Entrance one  -CB     Stair Railings, Main Entrance none  -CB     Row Name 05/27/18 0903          Stairs Within Home, Primary    Number of Stairs, Within Home, Primary three  -CB     Stair Railings, Within Home, Primary none  -CB     Row Name 05/27/18 0903          Cognitive Assessment/Interventions    Additional Documentation Cognitive Assessment/Intervention (Group)  -CB     Row Name 05/27/18 0903          Cognitive Assessment/Intervention- PT/OT    Affect/Mental Status (Cognitive) WFL  -CB     Orientation Status (Cognition) oriented x 4  -CB     Follows Commands (Cognition) WFL  -CB     Cognitive Function (Cognitive) WFL  -CB     Personal Safety Interventions fall prevention program maintained;gait belt;nonskid shoes/slippers when out of bed  -CB     Row Name 05/27/18 0903          Bed Mobility Assessment/Treatment    Bed Mobility Assessment/Treatment supine-sit;sit-supine  -CB     Supine-Sit [place] (Bed Mobility) independent   HOB down and no rails  -CB     Sit-Supine [place] (Bed Mobility) independent   HOB down and no rails  -CB     Assistive Device (Bed Mobility) --   none  -CB     Row Name 05/27/18 0903          Transfer  Assessment/Treatment    Transfer Assessment/Treatment stand-sit transfer;sit-stand transfer  -CB     Maintains Weight-bearing Status (Transfers) able to maintain  -CB     Sit-Stand [place] (Transfers) independent  -CB     Stand-Sit [place] (Transfers) independent  -CB     Row Name 05/27/18 0903          Sit-Stand Transfer    Assistive Device (Sit-Stand Transfers) --   none  -CB     Row Name 05/27/18 0903          Stand-Sit Transfer    Assistive Device (Stand-Sit Transfers) --   none  -CB     Row Name 05/27/18 0903          Gait/Stairs Assessment/Training    Gait/Stairs Assessment/Training gait/ambulation independence;gait/ambulation assistive device;distance ambulated  -CB     [place] Level (Gait) independent  -CB     Assistive Device (Gait) --   none  -CB     Distance in Feet (Gait) 400  -CB     Negotiation (Stairs) stairs independence  -CB     [place] Level (Stairs) independent  -CB     Assistive Device (Stairs) --   none  -CB     Handrail Location (Stairs) none  -CB     Number of Steps (Stairs) 3  -CB     Ascending Technique (Stairs) step-over-step  -CB     Descending Technique (Stairs) step-to-step  -CB     Row Name 05/27/18 0903          General ROM    GENERAL ROM COMMENTS AROM WFL BUE, BLE  -CB     [facility] Name 05/27/18 0903          General Assessment (Manual Muscle Testing)    Comment, General Manual Muscle Testing (MMT) Assessment grossly BLE 4/5  -CB     Row Name 05/27/18 0903          Sensory Assessment/Intervention    Sensory General Assessment no sensation deficits identified  -CB     Row Name 05/27/18 0903          Vision Assessment/Intervention    Visual Impairment/Limitations corrective lenses for reading  -CB     Row Name 05/27/18 0903          Pain Assessment    Additional Documentation Pain Scale: Numbers Pre/Post-Treatment (Group)  -CB     Row Name 05/27/18 0903          Pain Scale: Numbers Pre/Post-Treatment    Pain Scale: Numbers, Pretreatment 10/10  -CB     Pain Scale: Numbers,  Post-Treatment 5/10  -CB     Pain Location - Side Bilateral  -CB     Pain Location - Orientation distal  -CB     Pain Location foot  -CB     Row Name 05/27/18 0903          Plan of Care Review    Plan of Care Reviewed With patient  -CB     Row Name 05/27/18 0903          Physical Therapy Clinical Impression    Date of Referral to PT 05/25/18  -CB     PT Diagnosis (PT Clinical Impression) impaired physical mobility due to A FIB  -CB     Criteria for Skilled Interventions Met (PT Clinical Impression) no;treatment indicated  -CB     Impairments Found (describe specific impairments) --   none  -CB     Predicted Duration of Therapy (PT) --   being discharged due to PT without A FIB/increased rate  -CB     Care Plan Review (PT) care plan/treatment goals reviewed  -CB     Row Name 05/27/18 0903          Vital Signs    Pre Systolic BP Rehab 129  -CB     Pre Treatment Diastolic BP 85  -CB     Post Systolic BP Rehab 141  -CB     Post Treatment Diastolic BP 67  -CB     Pretreatment Heart Rate (beats/min) 80  -CB     Posttreatment Heart Rate (beats/min) 95  -CB     Pre SpO2 (%) 98  -CB     O2 Delivery Pre Treatment room air  -CB     Post SpO2 (%) 98  -CB     O2 Delivery Post Treatment room air  -CB     Pre Patient Position Standing  -CB     Intra Patient Position Standing  -CB     Post Patient Position Sitting  -CB     Row Name 05/27/18 0903          Physical Therapy Goals    Bed Mobility Goal Selection (PT) bed mobility, PT goal 1  -CB     Transfer Goal Selection (PT) transfer, PT goal 1  -CB     Gait Training Goal Selection (PT) gait training, PT goal 1  -CB     Stairs Goal Selection (PT) stairs, PT goal 1  -CB     Additional Documentation Stairs Goal Selection (PT) (Row)  -CB     Row Name 05/27/18 0903          Bed Mobility Goal 1 (PT)    Activity/Assistive Device (Bed Mobility Goal 1, PT) sit to supine;supine to sit   H)OB down and no rails  -CB     [place] Level/Cues Needed (Bed Mobility Goal 1, PT) independent  -CB      Time Frame (Bed Mobility Goal 1, PT) by discharge  -CB     Progress/Outcomes (Bed Mobility Goal 1, PT) goal met  -CB     Row Name 05/27/18 0903          Transfer Goal 1 (PT)    Activity/Assistive Device (Transfer Goal 1, PT) sit-to-stand/stand-to-sit;bed-to-chair/chair-to-bed  -CB     [place] Level/Cues Needed (Transfer Goal 1, PT) independent  -CB     Time Frame (Transfer Goal 1, PT) 1 day  -CB     Progress/Outcome (Transfer Goal 1, PT) goal met  -CB     Row Name 05/27/18 0903          Gait Training Goal 1 (PT)    Activity/Assistive Device (Gait Training Goal 1, PT) gait (walking locomotion);assistive device use;turning, left;turning, right  -CB     [place] Level (Gait Training Goal 1, PT) independent  -CB     Distance (Gait Goal 1, PT) 400 feet  -CB     Time Frame (Gait Training Goal 1, PT) 1 day  -CB     Progress/Outcome (Gait Training Goal 1, PT) goal met  -CB     Row Name 05/27/18 0903          Stairs Goal 1 (PT)    Activity/Assistive Device (Stairs Goal 1, PT) ascending stairs;descending stairs   no rail  -CB     [place] Level/Cues Needed (Stairs Goal 1, PT) independent  -CB     Number of Stairs (Stairs Goal 1, PT) 3  -CB     Time Frame (Stairs Goal 1, PT) 1 day  -CB     Progress/Outcome (Stairs Goal 1, PT) goal met  -CB     Row Name 05/27/18 0903          Positioning and Restraints    Pre-Treatment Position bathroom  -CB     Post Treatment Position bed  -CB     In Bed call light within reach;side rails up x2  -CB     Row Name 05/27/18 0903          Living Environment    Home Accessibility stairs to enter home;stairs within home  -CB       User Key  (r) = Recorded By, (t) = Taken By, (c) = Cosigned By    Initials Name Provider Type    CB Jemma Phoenix, PT Physical Therapist              PT Recommendation and Plan  Anticipated Discharge Disposition (PT): home  Outcome Summary/Treatment Plan (PT)  Anticipated Equipment Needs at Discharge (PT):  (none)  Anticipated Discharge Disposition (PT):  home  Plan of Care Reviewed With: patient  Outcome Summary: PT eval completed, was up ambulating in room on arrival , was able to get into bed with HOB down and no rails and then come to sit EOB all independently, was able to ambulate 400 feet no device no LOB independently with heart rate going only to 103 with pulseoxcemetry and per telemetry no signs for concern, able to go up and down 3 steps no rail independently, informed Dr Pritchard of findings, no need for skilled PT at this time          Outcome Measures     Row Name 05/27/18 0903             How much help from another person do you currently need...    Turning from your back to your side while in flat bed without using bedrails? 4  -CB      Moving from lying on back to sitting on the side of a flat bed without bedrails? 4  -CB      Moving to and from a bed to a chair (including a wheelchair)? 4  -CB      Standing up from a chair using your arms (e.g., wheelchair, bedside chair)? 4  -CB      Climbing 3-5 steps with a railing? 4  -CB      To walk in hospital room? 4  -CB      AM-PAC 6 Clicks Score 24  -CB         Functional Assessment    Outcome Measure Options AM-PAC 6 Clicks Basic Mobility (PT)  -CB        User Key  (r) = Recorded By, (t) = Taken By, (c) = Cosigned By    Initials Name Provider Type    PAMELA Phoenix, PT Physical Therapist           Time Calculation:         PT Charges     Row Name 05/27/18 1145             Time Calculation    Start Time 0903  -CB      Stop Time 0927  -CB      Time Calculation (min) 24 min  -CB      PT Received On 05/27/18  -CB         Time Calculation- PT    Total Timed Code Minutes- PT 9 minute(s)  -CB        User Key  (r) = Recorded By, (t) = Taken By, (c) = Cosigned By    Initials Name Provider Type    PAMELA Phoenix PT Physical Therapist          Therapy Charges for Today     Code Description Service Date Service Provider Modifiers Qty    04385731874  PT MOBILITY CURRENT 5/27/2018 Jemma Phoenix, PT GP, [place] 1     [id number]  PT MOBILITY PROJECTED 5/27/2018 Jemma Phoenix, PT GP, [place] 1    24981612019 HC PT MOBILITY DISCHARGE 5/27/2018 Jemma Phoenix, PT GP,  1    27511129337 HC PT EVAL MOD COMPLEXITY 1 5/27/2018 Jemma Phoenix, PT GP 1    31290062603 HC PT THERAPEUTIC ACT EA 15 MIN 5/27/2018 Jemma Phoenix, PT GP 1          PT G-Codes  PT Professional Judgement Used?: Yes  Outcome Measure Options: AM-PAC 6 Clicks Basic Mobility (PT)  Score: 23  Functional Limitation: Mobility: Walking and moving around  Mobility: Walking and Moving Around Current Status (): 0 percent impaired, limited or restricted  Mobility: Walking and Moving Around Goal Status (): 0 percent impaired, limited or restricted  Mobility: Walking and Moving Around Discharge Status (): 0 percent impaired, limited or restricted      Jemma Phoenix, PT  5/27/2018

## 2018-05-27 NOTE — THERAPY DISCHARGE NOTE
Acute Care - Physical Therapy Discharge Summary  [facility]       Patient Name: Austen Brito  : 1950  MRN: [id number]    Today's Date: 2018  Onset of Illness/Injury or Date of Surgery: 18    Date of Referral to PT: 18  Referring Physician: Dr Pritchard      Admit Date: 2018      PT Recommendation and Plan    Visit Dx:    ICD-10-CM ICD-9-CM   1. Hypotension, unspecified hypotension type I95.9 458.9   2. Atrial fibrillation, unspecified type I48.91 427.31             Outcome Measures     Row Name 18 0903             How much help from another person do you currently need...    Turning from your back to your side while in flat bed without using bedrails? 4  -CB      Moving from lying on back to sitting on the side of a flat bed without bedrails? 4  -CB      Moving to and from a bed to a chair (including a wheelchair)? 4  -CB      Standing up from a chair using your arms (e.g., wheelchair, bedside chair)? 4  -CB      Climbing 3-5 steps with a railing? 4  -CB      To walk in hospital room? 4  -CB      AM-PAC 6 Clicks Score 24  -CB         Functional Assessment    Outcome Measure Options AM-PAC 6 Clicks Basic Mobility (PT)  -CB        User Key  (r) = Recorded By, (t) = Taken By, (c) = Cosigned By    Initials Name Provider Type    PAMELA Phoenix, PT Physical Therapist                PT Charges     Row Name 18 1145             Time Calculation    Start Time 903  -CB      Stop Time 927  -CB      Time Calculation (min) 24 min  -CB      PT Received On 18  -CB         Time Calculation- PT    Total Timed Code Minutes- PT 9 minute(s)  -CB        User Key  (r) = Recorded By, (t) = Taken By, (c) = Cosigned By    Initials Name Provider Type    PAMELA Phoenix, PT Physical Therapist                  PT Rehab Goals     Row Name 18 0903             Bed Mobility Goal 1 (PT)    Activity/Assistive Device (Bed Mobility Goal 1, PT) sit to supine;supine to sit   H)OB  down and no rails  -CB      [place] Level/Cues Needed (Bed Mobility Goal 1, PT) independent  -CB      Time Frame (Bed Mobility Goal 1, PT) by discharge  -CB      Progress/Outcomes (Bed Mobility Goal 1, PT) goal met  -CB         Transfer Goal 1 (PT)    Activity/Assistive Device (Transfer Goal 1, PT) sit-to-stand/stand-to-sit;bed-to-chair/chair-to-bed  -CB      [place] Level/Cues Needed (Transfer Goal 1, PT) independent  -CB      Time Frame (Transfer Goal 1, PT) 1 day  -CB      Progress/Outcome (Transfer Goal 1, PT) goal met  -CB         Gait Training Goal 1 (PT)    Activity/Assistive Device (Gait Training Goal 1, PT) gait (walking locomotion);assistive device use;turning, left;turning, right  -CB      [place] Level (Gait Training Goal 1, PT) independent  -CB      Distance (Gait Goal 1, PT) 400 feet  -CB      Time Frame (Gait Training Goal 1, PT) 1 day  -CB      Progress/Outcome (Gait Training Goal 1, PT) goal met  -CB         Stairs Goal 1 (PT)    Activity/Assistive Device (Stairs Goal 1, PT) ascending stairs;descending stairs   no rail  -CB      [place] Level/Cues Needed (Stairs Goal 1, PT) independent  -CB      Number of Stairs (Stairs Goal 1, PT) 3  -CB      Time Frame (Stairs Goal 1, PT) 1 day  -CB      Progress/Outcome (Stairs Goal 1, PT) goal met  -CB        User Key  (r) = Recorded By, (t) = Taken By, (c) = Cosigned By    Initials Name Provider Type Discipline    CB Jemma Phoenix, PT Physical Therapist PT          Therapy Charges for Today     Code Description Service Date Service Provider Modifiers Qty    84544448687 HC PT MOBILITY CURRENT 5/27/2018 Jemma Phoenix, PT GP, CH 1    13448596107 HC PT MOBILITY PROJECTED 5/27/2018 Jemma Phoenix, PT GP, CH 1    65432011339 HC PT MOBILITY DISCHARGE 5/27/2018 Jemma Phoenix, PT GP, CH 1    38269917350 HC PT EVAL MOD COMPLEXITY 1 5/27/2018 Jemma Phoenix, PT GP 1    25639134673 HC PT THERAPEUTIC ACT EA 15 MIN 5/27/2018 Jemma Phoenix, PT GP  1          PT Discharge Summary  Anticipated Discharge Disposition (PT): home  Reason for Discharge: Discharge from facility, Per MD order  Outcomes Achieved: Able to achieve all goals within established timeline  Discharge Destination: Home with home health      Jemma Phoenix, PT   5/27/2018

## 2018-05-27 NOTE — NURSING NOTE
Prescription given to patient at discharge for home glucometer, lancets x100, and glucose strips x100.

## 2018-05-27 NOTE — DISCHARGE PLACEMENT REQUEST
"Austen Medeiros (67 y.o. Male)     Date of Birth Social Security Number Address Home Phone MRN    1950  [address] [place] [id number] [phone] [id number]    [place] Marital Status          None        Admission Date Admission Type Admitting Provider Attending Provider Department, Room/Bed    5/24/18 Emergency Edmund Quiros MD Popescu, Tudor, MD [address], 361/1    Discharge Date Discharge Disposition Discharge Destination         Home-Health Care [facility]              Attending Provider:  Edmund Quiros MD    Allergies:  No Known Allergies    Isolation:  None   Infection:  None   Code Status:  FULL    Ht:  177.8 cm (70\")   Wt:  86.2 kg (190 lb)    Admission Cmt:  None   Principal Problem:  Hypotension [I95.9]                 Active Insurance as of 5/24/2018     Primary Coverage     Payor Plan Insurance Group Employer/Plan Group    MEDICARE MEDICARE A & B      Payor Plan Address Payor Plan Phone Number Effective From Effective To    [address] [phone] 9/1/2001     [facility] [address]       Subscriber Name Subscriber Birth Date Member ID       AUSTEN MEDEIROS 1950 [id number]           Secondary Coverage     Payor Plan Insurance Group Employer/Plan Group    [employer] [place] [id number]     Payor Plan Address Payor Plan Phone Number Effective From Effective To    [employer] [employer] [address] [phone] 11/19/2002     [address] [facility] [address]       Subscriber Name Subscriber Birth Date Member ID       AUSTEN MEDEIROS 1950 [id number]            Tertiary Coverage     Payor Plan Insurance Group Employer/Plan Group    [employer] SUPP [id number]     Payor Plan Address Payor Plan Phone Number Effective From Effective To    [address] [phone] 5/24/2018     [facility] [address]       Subscriber Name Subscriber Birth Date Member ID       AUSTEN MEDEIROS 1950 [id number]  "                Emergency Contacts      (Rel.) Home Phone Work Phone Mobile Phone    Joanna Jenkins (Relative) [phone] -- [phone]            Insurance Information                MEDICARE/MEDICARE A & B Phone: [phone]    Subscriber: Austen Brito Subscriber#: [id number]    Group#:  Precert#:         [employer] [employer] 111 Phone: [phone]    Subscriber: Austen Brito Subscriber#: [id number]     Group#: [id number] Precert#:         [employer]CIAL  SUPP Phone: [phone]    Subscriber: Austen Brito Subscriber#: [id number]    Group#: [id number] Precert#:              History & Physical      Edmund Quiros MD at 5/24/2018 10:36 PM          History and Physical  Edmund Quiros MD  Hospitalist    Date of admission: 5/24/2018    Patient Care Team:  GARFIELD Christian as PCP     Chief complaint   Chief Complaint   Patient presents with   • Dizziness     Subjective     Patient is a 67 y.o. male admitted for weakness, dizziness. He has felt worse for the last few days. He denies loss of consciousness.     History  Past Medical History:   Diagnosis Date   • Atherosclerosis    • Chronic atrial fibrillation    • Chronic bronchitis    • Diabetes mellitus    • GERD (gastroesophageal reflux disease)    • Graves disease    • Hypertension      Past Surgical History:   Procedure Laterality Date   • HERNIA REPAIR       Family History   Problem Relation Age of Onset   • Hypertension Father      Social History   Substance Use Topics   • Smoking status: Current Every Day Smoker     Packs/day: 1.00     Types: Cigarettes   • Smokeless tobacco: Never Used   • Alcohol use No     Prescriptions Prior to Admission   Medication Sig Dispense Refill Last Dose   • apixaban (ELIQUIS) 5 MG tablet tablet Take 5 mg by mouth 2 (Two) Times a Day. Pt states that the eliquis is ordered twice a day but he has only been taking it once a day for the past 3 years because  he is not home enough to take it twice a day.   5/24/2018 at 0900   • atorvastatin (LIPITOR) 80 MG tablet Take 80 mg by mouth Daily.   5/24/2018 at 0900   • cholecalciferol (VITAMIN D3) 52219 units capsule Take  by mouth Daily.   5/24/2018 at 0900   • furosemide (LASIX) 40 MG tablet Take 40 mg by mouth 2 (Two) Times a Day. Pt states that the lasix is ordered twice a day but he only takes it once a day because he is not home enough.   5/24/2018 at 0900   • gabapentin (NEURONTIN) 300 MG capsule Take 300 mg by mouth 3 (Three) Times a Day.   5/24/2018 at 0900   • insulin aspart (novoLOG) 100 UNIT/ML injection Inject  under the skin 3 (Three) Times a Day Before Meals.   5/24/2018 at 0900   • levothyroxine (SYNTHROID, LEVOTHROID) 112 MCG tablet Take 112 mcg by mouth Daily.   5/24/2018 at 0900   • losartan (COZAAR) 25 MG tablet Take 25 mg by mouth Daily.   5/24/2018 at 0900   • metoprolol tartrate (LOPRESSOR) 100 MG tablet Take 100 mg by mouth 2 (Two) Times a Day. Pt states that the metoprolol is ordered twice a day but he only takes it once a day because he is not home enough.   5/24/2018 at 0900   • omeprazole (priLOSEC) 20 MG capsule Take 20 mg by mouth Daily.   5/24/2018 at 0900   • ondansetron (ZOFRAN) 8 MG tablet Take  by mouth Every 8 (Eight) Hours As Needed for Nausea or Vomiting.   5/24/2018 at 0900   • spironolactone (ALDACTONE) 25 MG tablet Take 25 mg by mouth Daily.   5/24/2018 at 0900   • tamsulosin (FLOMAX) 0.4 MG capsule 24 hr capsule Take 1 capsule by mouth Every Night.   5/24/2018 at 0900     Allergies:  Patient has no known allergies.    Review of Systems  Review of Systems   Constitutional: Positive for fatigue. Negative for fever.   Respiratory: Positive for shortness of breath. Negative for cough and wheezing.    Cardiovascular: Negative for chest pain, palpitations and leg swelling.   Gastrointestinal: Negative for abdominal distention, abdominal pain, constipation, nausea and vomiting.    Genitourinary: Negative for dysuria, frequency, penile swelling and urgency.   Musculoskeletal: Positive for arthralgias and back pain. Negative for joint swelling.   Skin: Positive for pallor. Negative for color change.   Neurological: Positive for dizziness, weakness and light-headedness. Negative for seizures, syncope, numbness and headaches.   Psychiatric/Behavioral: Negative for agitation, behavioral problems and confusion.   All other systems reviewed and are negative.      Objective     Vital Signs  Temp:  [96.1 °F (35.6 °C)-97 °F (36.1 °C)] 96.7 °F (35.9 °C)  Heart Rate:  [] 80  Resp:  [15-18] 18  BP: ()/(51-72) 109/72    Physical Exam:  Physical Exam   Constitutional: He is oriented to person, place, and time. He appears well-developed and well-nourished. No distress.   HENT:   Head: Normocephalic and atraumatic.   Eyes: EOM are normal. Pupils are equal, round, and reactive to light. No scleral icterus.   Neck: Normal range of motion. Neck supple.   Cardiovascular: Normal rate.  An irregular rhythm present.   Pulmonary/Chest: Effort normal and breath sounds normal. No respiratory distress.   Abdominal: Soft. Bowel sounds are normal. He exhibits no distension. There is no tenderness.   Musculoskeletal: Normal range of motion. He exhibits no edema or tenderness.   Neurological: He is alert and oriented to person, place, and time. No cranial nerve deficit.   Skin: Skin is warm and dry. No rash noted. No erythema. There is pallor.   Psychiatric: He has a normal mood and affect. His behavior is normal.   Vitals reviewed.      Results Review:   Lab Results (last 24 hours)     Procedure Component Value Units Date/Time    POC Glucose Once [[id number]]  (Normal) Collected:  05/25/18 0741    Specimen:  Blood Updated:  05/25/18 0817     Glucose 119 mg/dL      Comment: RN NotifiedMeter: [id number]: [id number] RAMSEY CARTER       POC Glucose Once [[id number]]  (Abnormal) Collected:  05/24/18 2978     Specimen:  Blood Updated:  05/25/18 0812     Glucose 220 (H) mg/dL      Comment: Sliding Scale AdminMeter: [id number]: [id number] LADAN GREEN       Blood Culture - Blood, [[id number]]  (Normal) Collected:  05/24/18 1807    Specimen:  Blood from Arm, Right Updated:  05/25/18 0715     Blood Culture No growth at less than 24 hours    Blood Culture - Blood, [[id number]]  (Normal) Collected:  05/24/18 1815    Specimen:  Blood from Arm, Left Updated:  05/25/18 0715     Blood Culture No growth at less than 24 hours    Basic Metabolic Panel [[id number]]  (Abnormal) Collected:  05/25/18 0536    Specimen:  Blood Updated:  05/25/18 0553     Glucose 78 mg/dL      BUN 25 (H) mg/dL      Creatinine 1.12 mg/dL      Sodium 140 mmol/L      Potassium 3.9 mmol/L      Chloride 108 mmol/L      CO2 24.0 mmol/L      Calcium 8.5 mg/dL      eGFR Non African Amer 66 mL/min/1.73      BUN/Creatinine Ratio 22.3     Anion Gap 8.0 mmol/L     Urinalysis With / Microscopic If Indicated (No Culture) - Urine, Clean Catch [[id number]]  (Abnormal) Collected:  05/24/18 2310    Specimen:  Urine from Urine, Clean Catch Updated:  05/24/18 2318     Color, UA Yellow     Appearance, UA Clear     pH, UA 5.5     Specific Gravity, UA 1.024     Glucose, UA >=1000 mg/dL (3+) (A)     Ketones, UA Negative     Bilirubin, UA Negative     Blood, UA Negative     Protein, UA Negative     Leuk Esterase, UA Negative     Nitrite, UA Negative     Urobilinogen, UA 1.0 E.U./dL    Narrative:       Urine microscopic not indicated.    Lactic Acid, Reflex [[id number]]  (Abnormal) Collected:  05/24/18 2210    Specimen:  Blood Updated:  05/24/18 2226     Lactate 2.1 (C) mmol/L     Lactic Acid, Reflex Timer (This will reflex a repeat order 3-3:15 hours after ordered.) [[id number]] Collected:  05/24/18 1823    Specimen:  Blood Updated:  05/24/18 2200     Extra Tube Hold for add-ons.     Comment: Auto resulted.       [place] Draw [[id number]] Collected:  05/24/18 1823     Specimen:  Blood Updated:  05/24/18 1930    Narrative:       The following orders were created for panel order [place] Draw.  Procedure                               Abnormality         Status                     ---------                               -----------         ------                     Light Blue Top[[id number]]                                   Final result               Green Top (Gel)[[id number]]                                  Final result               Lavender Top[[id number]]                                     Final result               Gold Top - SST[[id number]]                                   Final result                 Please view results for these tests on the individual orders.    Light Blue Top [[id number]] Collected:  05/24/18 1823    Specimen:  Blood Updated:  05/24/18 1930     Extra Tube hold for add-on     Comment: Auto resulted       Green Top (Gel) [[id number]] Collected:  05/24/18 1823    Specimen:  Blood Updated:  05/24/18 1930     Extra Tube Hold for add-ons.     Comment: Auto resulted.       Lavender Top [[id number]] Collected:  05/24/18 1823    Specimen:  Blood Updated:  05/24/18 1930     Extra Tube hold for add-on     Comment: Auto resulted       Gold Top - SST [[id number]] Collected:  05/24/18 1823    Specimen:  Blood Updated:  05/24/18 1930     Extra Tube Hold for add-ons.     Comment: Auto resulted.       Comprehensive Metabolic Panel [[id number]]  (Abnormal) Collected:  05/24/18 1823    Specimen:  Blood Updated:  05/24/18 1920     Glucose 191 (H) mg/dL      BUN 32 (H) mg/dL      Creatinine 1.78 (H) mg/dL      Sodium 135 (L) mmol/L      Potassium 4.2 mmol/L      Chloride 97 mmol/L      CO2 26.0 mmol/L      Calcium 9.3 mg/dL      Total Protein 7.1 g/dL      Albumin 4.20 g/dL      ALT (SGPT) 25 U/L      AST (SGOT) 17 U/L      Alkaline Phosphatase 64 U/L      Total Bilirubin 0.6 mg/dL      eGFR Non African Amer 38 (L) mL/min/1.73      Globulin 2.9 gm/dL      A/G Ratio 1.4 g/dL       BUN/Creatinine Ratio 18.0     Anion Gap 12.0 mmol/L     Troponin [[id number]]  (Normal) Collected:  05/24/18 1823    Specimen:  Blood Updated:  05/24/18 1903     Troponin I 0.033 ng/mL     BNP [[id number]]  (Normal) Collected:  05/24/18 1823    Specimen:  Blood Updated:  05/24/18 1903     proBNP 426.0 pg/mL     Lactic Acid, Plasma [[id number]]  (Abnormal) Collected:  05/24/18 1823    Specimen:  Blood Updated:  05/24/18 1854     Lactate 2.4 (C) mmol/L     Magnesium [[id number]]  (Normal) Collected:  05/24/18 1823    Specimen:  Blood Updated:  05/24/18 1850     Magnesium 1.8 mg/dL     Protime-INR [[id number]]  (Normal) Collected:  05/24/18 1823    Specimen:  Blood Updated:  05/24/18 1847     Protime 14.1 Seconds      INR 1.11    Narrative:       Therapeutic range for most indications is 2.0-3.0 INR,  or 2.5-3.5 for mechanical heart valves.    CBC & Differential [[id number]] Collected:  05/24/18 1823    Specimen:  Blood Updated:  05/24/18 1831    Narrative:       The following orders were created for panel order CBC & Differential.  Procedure                               Abnormality         Status                     ---------                               -----------         ------                     CBC Auto Differential[[id number]]        Abnormal            Final result                 Please view results for these tests on the individual orders.    CBC Auto Differential [[id number]]  (Abnormal) Collected:  05/24/18 1823    Specimen:  Blood Updated:  05/24/18 1831     WBC 9.01 10*3/mm3      RBC 4.59 10*6/mm3      Hemoglobin 14.7 g/dL      Hematocrit 40.9 %      MCV 89.1 fL      MCH 32.0 pg      MCHC 35.9 g/dL      RDW 13.0 %      RDW-SD 42.3 fl      MPV 10.3 fL      Platelets 188 10*3/mm3      Neutrophil % 61.6 %      Lymphocyte % 29.7 %      Monocyte % 6.3 %      Eosinophil % 1.8 %      Basophil % 0.3 %      Immature Grans % 0.3 %      Neutrophils, Absolute 5.54 10*3/mm3      Lymphocytes, Absolute 2.68 10*3/mm3       Monocytes, Absolute 0.57 10*3/mm3      Eosinophils, Absolute 0.16 10*3/mm3      Basophils, Absolute 0.03 10*3/mm3      Immature Grans, Absolute 0.03 (H) 10*3/mm3           Imaging Results (last 24 hours)     Procedure Component Value Units Date/Time    XR Chest 1 View [[id number]] Collected:  18     Updated:  18    Narrative:         EXAM:         Radiograph(s), Chest   VIEWS:   Portable ; 1       DATE/TIME:  2018 8:41 PM CDT                INDICATION:   dizziness    COMPARISON:  CXR: none             FINDINGS:             - lines/tubes:    none     - cardiac:         size within normal limits         - mediastinum: contour within normal limits         - lungs:         no focal air space process, pulmonary  interstitial edema, nodule(s)/mass             - pleura:         no evidence of  fluid                  - osseous:         unremarkable for age                  - misc.:         Impression:       CONCLUSION:        1. No evidence of an active cardiopulmonary process.    Electronically signed by:  FANY Fuller MD  2018 8:41  PM CDT Workstation: [id number]          Assessment/Plan     Principal Problem:    Hypotension  Active Problems:    Chronic atrial fibrillation    CKD (chronic kidney disease) stage 3, GFR 30-59 ml/min    Diabetes mellitus    Atherosclerosis    Chronic bronchitis    Hold the antihypertensive medications for now, gentle IV hydration, obtain cardiac Echo, repeat BMP and ECG in AM.    Edmund Quiros MD  18  9:37 AM      Electronically signed by Edmund Quiros MD at 2018  9:42 AM       [address] [address]  Phone:  [phone]  Fax:   Date: May 27, 2018      Ambulatory Referral to Home Health     Patient:  Austen Brito MRN:  [id number]   [address] [place] [address] :  1950  SSN:    Phone: [phone] Sex:  M      INSURANCE PAYOR PLAN GROUP # SUBSCRIBER ID    Primary:  Secondary:    MEDICARE  [employer] [id number]  [id number]    [id number]  [id number]       Referring Provider Information:  RAMBO COOPER Phone: [phone] Fax:       Referral Information:   # Visits:  1 Referral Type: Home Health [42]   Urgency:  Routine Referral Reason: Specialty Services Required   Start Date: May 27, 2018 End Date:  To be determined by Insurer   Diagnosis: Hypotension, unspecified hypotension type (I95.9 [ICD-10-CM] 458.9 [ICD-9-CM])  Atrial fibrillation, unspecified type (I48.91 [ICD-10-CM] 427.31 [ICD-9-CM])      Refer to Dept:   Refer to Provider:   Refer to Facility:       Face to Face Visit Date: 5/27/2018  Follow-up Provider for Plan of Care? I treated the patient in an acute care facility and will not continue treatment after discharge.  Follow-up Provider: THO GRACE [[id number]]  Reason/Clinical Findings: transitional visit  Describe mobility limitations that make leaving home difficult: transitional visit  Nursing/Therapeutic Services Requested: Other  Frequency: 1 Week 1     This document serves as a request of services and does not constitute Insurance authorization or approval of services.  To determine eligibility, please contact the members Insurance carrier to verify and review coverage.     If you have medical questions regarding this request for services. Please contact [address] at [phone] between the hours of 8:00am - 5:00pm (Mon-Fri).             Authorizing Provider:Rambo Cooper MD  Authorizing Provider's NPI: [id number]  Order Entered By: Rambo Cooper MD 5/27/2018 10:17 AM     Electronically signed by: Rambo Cooper MD 5/27/2018 10:17 AM

## 2018-05-29 LAB
BACTERIA SPEC AEROBE CULT: NORMAL
BACTERIA SPEC AEROBE CULT: NORMAL

## 2019-01-23 ENCOUNTER — APPOINTMENT (OUTPATIENT)
Dept: ULTRASOUND IMAGING | Facility: HOSPITAL | Age: 69
End: 2019-01-23

## 2019-01-23 ENCOUNTER — APPOINTMENT (OUTPATIENT)
Dept: GENERAL RADIOLOGY | Facility: HOSPITAL | Age: 69
End: 2019-01-23

## 2019-01-23 ENCOUNTER — HOSPITAL ENCOUNTER (EMERGENCY)
Facility: HOSPITAL | Age: 69
Discharge: HOME | End: 2019-01-23
Attending: EMERGENCY MEDICINE | Admitting: EMERGENCY MEDICINE

## 2019-01-23 VITALS
RESPIRATION RATE: 18 BRPM | DIASTOLIC BLOOD PRESSURE: 60 MMHG | HEIGHT: 70 IN | HEART RATE: 92 BPM | WEIGHT: 200 LBS | OXYGEN SATURATION: 96 % | TEMPERATURE: 97 F | BODY MASS INDEX: 28.63 KG/M2 | SYSTOLIC BLOOD PRESSURE: 137 MMHG

## 2019-01-23 DIAGNOSIS — I48.20 CHRONIC ATRIAL FIBRILLATION WITH RVR (HCC): ICD-10-CM

## 2019-01-23 DIAGNOSIS — M79.605 PAIN OF LEFT LOWER EXTREMITY: Primary | ICD-10-CM

## 2019-01-23 LAB
ALBUMIN SERPL-MCNC: 4.3 G/DL (ref 3.4–4.8)
ALBUMIN/GLOB SERPL: 1.5 G/DL (ref 1.1–1.8)
ALP SERPL-CCNC: 73 U/L (ref 38–126)
ALT SERPL W P-5'-P-CCNC: 18 U/L (ref 21–72)
ANION GAP SERPL CALCULATED.3IONS-SCNC: 13 MMOL/L (ref 5–15)
AST SERPL-CCNC: 22 U/L (ref 17–59)
BASOPHILS # BLD AUTO: 0.02 10*3/MM3 (ref 0–0.2)
BASOPHILS NFR BLD AUTO: 0.2 % (ref 0–2)
BILIRUB SERPL-MCNC: 0.7 MG/DL (ref 0.2–1.3)
BILIRUB UR QL STRIP: NEGATIVE
BUN BLD-MCNC: 19 MG/DL (ref 7–21)
BUN/CREAT SERPL: 18.8 (ref 7–25)
CALCIUM SPEC-SCNC: 9.9 MG/DL (ref 8.4–10.2)
CHLORIDE SERPL-SCNC: 101 MMOL/L (ref 95–110)
CLARITY UR: CLEAR
CO2 SERPL-SCNC: 25 MMOL/L (ref 22–31)
COLOR UR: YELLOW
CREAT BLD-MCNC: 1.01 MG/DL (ref 0.7–1.3)
D-DIMER, QUANTITATIVE (MAD,POW, STR): 281 NG/ML (FEU) (ref 0–470)
DEPRECATED RDW RBC AUTO: 46.1 FL (ref 35.1–43.9)
EOSINOPHIL # BLD AUTO: 0.2 10*3/MM3 (ref 0–0.7)
EOSINOPHIL NFR BLD AUTO: 2.5 % (ref 0–7)
ERYTHROCYTE [DISTWIDTH] IN BLOOD BY AUTOMATED COUNT: 14 % (ref 11.5–14.5)
GFR SERPL CREATININE-BSD FRML MDRD: 74 ML/MIN/1.73 (ref 49–113)
GLOBULIN UR ELPH-MCNC: 2.9 GM/DL (ref 2.3–3.5)
GLUCOSE BLD-MCNC: 303 MG/DL (ref 60–100)
GLUCOSE UR STRIP-MCNC: ABNORMAL MG/DL
HCT VFR BLD AUTO: 44.7 % (ref 39–49)
HGB BLD-MCNC: 15.8 G/DL (ref 13.7–17.3)
HGB UR QL STRIP.AUTO: NEGATIVE
HOLD SPECIMEN: NORMAL
HOLD SPECIMEN: NORMAL
IMM GRANULOCYTES # BLD AUTO: 0.01 10*3/MM3 (ref 0–0.02)
IMM GRANULOCYTES NFR BLD AUTO: 0.1 % (ref 0–0.5)
KETONES UR QL STRIP: NEGATIVE
LEUKOCYTE ESTERASE UR QL STRIP.AUTO: NEGATIVE
LYMPHOCYTES # BLD AUTO: 2.16 10*3/MM3 (ref 0.6–4.2)
LYMPHOCYTES NFR BLD AUTO: 26.6 % (ref 10–50)
MCH RBC QN AUTO: 31.9 PG (ref 26.5–34)
MCHC RBC AUTO-ENTMCNC: 35.3 G/DL (ref 31.5–36.3)
MCV RBC AUTO: 90.3 FL (ref 80–98)
MONOCYTES # BLD AUTO: 0.48 10*3/MM3 (ref 0–0.9)
MONOCYTES NFR BLD AUTO: 5.9 % (ref 0–12)
NEUTROPHILS # BLD AUTO: 5.26 10*3/MM3 (ref 2–8.6)
NEUTROPHILS NFR BLD AUTO: 64.7 % (ref 37–80)
NITRITE UR QL STRIP: NEGATIVE
NT-PROBNP SERPL-MCNC: 620 PG/ML (ref 0–900)
PH UR STRIP.AUTO: <=5 [PH] (ref 5–9)
PLATELET # BLD AUTO: 166 10*3/MM3 (ref 150–450)
PMV BLD AUTO: 10 FL (ref 8–12)
POTASSIUM BLD-SCNC: 4.4 MMOL/L (ref 3.5–5.1)
PROT SERPL-MCNC: 7.2 G/DL (ref 6.3–8.6)
PROT UR QL STRIP: ABNORMAL
RBC # BLD AUTO: 4.95 10*6/MM3 (ref 4.37–5.74)
SODIUM BLD-SCNC: 139 MMOL/L (ref 137–145)
SP GR UR STRIP: 1.03 (ref 1–1.03)
TROPONIN I SERPL-MCNC: 0.02 NG/ML
TSH SERPL DL<=0.05 MIU/L-ACNC: 0.91 MIU/ML (ref 0.46–4.68)
UROBILINOGEN UR QL STRIP: ABNORMAL
WBC NRBC COR # BLD: 8.13 10*3/MM3 (ref 3.2–9.8)
WHOLE BLOOD HOLD SPECIMEN: NORMAL
WHOLE BLOOD HOLD SPECIMEN: NORMAL

## 2019-01-23 PROCEDURE — 93971 EXTREMITY STUDY: CPT

## 2019-01-23 PROCEDURE — 96365 THER/PROPH/DIAG IV INF INIT: CPT

## 2019-01-23 PROCEDURE — 81003 URINALYSIS AUTO W/O SCOPE: CPT | Performed by: EMERGENCY MEDICINE

## 2019-01-23 PROCEDURE — 93010 ELECTROCARDIOGRAM REPORT: CPT | Performed by: INTERNAL MEDICINE

## 2019-01-23 PROCEDURE — 84443 ASSAY THYROID STIM HORMONE: CPT | Performed by: EMERGENCY MEDICINE

## 2019-01-23 PROCEDURE — 80053 COMPREHEN METABOLIC PANEL: CPT | Performed by: EMERGENCY MEDICINE

## 2019-01-23 PROCEDURE — 93005 ELECTROCARDIOGRAM TRACING: CPT | Performed by: EMERGENCY MEDICINE

## 2019-01-23 PROCEDURE — 85379 FIBRIN DEGRADATION QUANT: CPT | Performed by: EMERGENCY MEDICINE

## 2019-01-23 PROCEDURE — 84484 ASSAY OF TROPONIN QUANT: CPT | Performed by: EMERGENCY MEDICINE

## 2019-01-23 PROCEDURE — 99284 EMERGENCY DEPT VISIT MOD MDM: CPT

## 2019-01-23 PROCEDURE — 85025 COMPLETE CBC W/AUTO DIFF WBC: CPT | Performed by: EMERGENCY MEDICINE

## 2019-01-23 PROCEDURE — 83880 ASSAY OF NATRIURETIC PEPTIDE: CPT | Performed by: EMERGENCY MEDICINE

## 2019-01-23 PROCEDURE — 71045 X-RAY EXAM CHEST 1 VIEW: CPT

## 2019-01-23 PROCEDURE — 93005 ELECTROCARDIOGRAM TRACING: CPT

## 2019-01-23 RX ORDER — ACETAMINOPHEN 500 MG
500 TABLET ORAL EVERY 6 HOURS PRN
COMMUNITY

## 2019-01-23 RX ORDER — HYDROCODONE BITARTRATE AND ACETAMINOPHEN 5; 325 MG/1; MG/1
1 TABLET ORAL EVERY 6 HOURS PRN
Qty: 15 TABLET | Refills: 0 | Status: SHIPPED | OUTPATIENT
Start: 2019-01-23

## 2019-01-23 RX ORDER — SODIUM CHLORIDE 0.9 % (FLUSH) 0.9 %
10 SYRINGE (ML) INJECTION AS NEEDED
Status: DISCONTINUED | OUTPATIENT
Start: 2019-01-23 | End: 2019-01-23 | Stop reason: HOSPADM

## 2019-01-23 RX ORDER — SODIUM CHLORIDE 9 MG/ML
INJECTION, SOLUTION INTRAVENOUS
Status: COMPLETED
Start: 2019-01-23 | End: 2019-01-23

## 2019-01-23 RX ADMIN — SODIUM CHLORIDE 1000 ML: 900 INJECTION, SOLUTION INTRAVENOUS at 02:50

## 2019-01-23 RX ADMIN — DILTIAZEM HYDROCHLORIDE 5 MG/HR: 5 INJECTION INTRAVENOUS at 02:42

## 2019-01-23 RX ADMIN — METOPROLOL TARTRATE 50 MG: 25 TABLET ORAL at 04:17

## 2019-01-23 NOTE — ED NOTES
Patient presents to ED with complaint of leg pain on left thigh. He states he has not been able to take any medications today due to not feeling well.      Yari Stroud RN  01/23/19 0302
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
## 2019-01-23 NOTE — DISCHARGE INSTRUCTIONS
Medications as prescribed.  Continue your heart and anticoagulation medication.  Return with any new or worsening symptoms, or any concerns.

## 2022-05-09 ENCOUNTER — TRANSCRIBE ORDERS (OUTPATIENT)
Dept: LAB | Facility: OTHER | Age: 72
End: 2022-05-09

## 2022-05-09 ENCOUNTER — LAB (OUTPATIENT)
Dept: LAB | Facility: OTHER | Age: 72
End: 2022-05-09

## 2022-05-09 DIAGNOSIS — H20.00 ACUTE IRITIS, RIGHT EYE: ICD-10-CM

## 2022-05-09 DIAGNOSIS — H20.00 ACUTE IRITIS, RIGHT EYE: Primary | ICD-10-CM

## 2022-05-09 LAB
B BURGDOR IGG SER QL: NEGATIVE
B BURGDOR IGM SER QL: NEGATIVE
ERYTHROCYTE [SEDIMENTATION RATE] IN BLOOD: 29 MM/HR (ref 0–15)
RPR SER QL: NORMAL

## 2022-05-09 PROCEDURE — 85651 RBC SED RATE NONAUTOMATED: CPT | Performed by: INTERNAL MEDICINE

## 2022-05-09 PROCEDURE — 82164 ANGIOTENSIN I ENZYME TEST: CPT | Performed by: OPHTHALMOLOGY

## 2022-05-09 PROCEDURE — 36415 COLL VENOUS BLD VENIPUNCTURE: CPT

## 2022-05-09 PROCEDURE — 86592 SYPHILIS TEST NON-TREP QUAL: CPT | Performed by: OPHTHALMOLOGY

## 2022-05-09 PROCEDURE — 86618 LYME DISEASE ANTIBODY: CPT | Performed by: OPHTHALMOLOGY

## 2022-05-09 PROCEDURE — 86780 TREPONEMA PALLIDUM: CPT | Performed by: OPHTHALMOLOGY

## 2022-05-09 PROCEDURE — 86480 TB TEST CELL IMMUN MEASURE: CPT | Performed by: OPHTHALMOLOGY

## 2022-05-09 PROCEDURE — 36415 COLL VENOUS BLD VENIPUNCTURE: CPT | Performed by: INTERNAL MEDICINE

## 2022-05-10 LAB
ACE SERPL-CCNC: 58 U/L (ref 14–82)
T PALLIDUM AB SER QL IF: NON REACTIVE

## 2022-05-12 LAB
GAMMA INTERFERON BACKGROUND BLD IA-ACNC: 0.24 IU/ML
M TB IFN-G BLD-IMP: NEGATIVE
M TB IFN-G CD4+ BCKGRND COR BLD-ACNC: 0.22 IU/ML
M TB IFN-G CD4+CD8+ BCKGRND COR BLD-ACNC: 0.21 IU/ML
MITOGEN IGNF BLD-ACNC: >10 IU/ML
SERVICE CMNT-IMP: NORMAL

## 2022-05-16 LAB — HLA-B27 QL NAA+PROBE: NEGATIVE
